# Patient Record
Sex: MALE | Race: OTHER | Employment: UNEMPLOYED | URBAN - METROPOLITAN AREA
[De-identification: names, ages, dates, MRNs, and addresses within clinical notes are randomized per-mention and may not be internally consistent; named-entity substitution may affect disease eponyms.]

---

## 2017-02-22 ENCOUNTER — APPOINTMENT (OUTPATIENT)
Dept: GENERAL RADIOLOGY | Age: 42
End: 2017-02-22
Attending: SPECIALIST
Payer: MEDICARE

## 2017-02-22 ENCOUNTER — HOSPITAL ENCOUNTER (OUTPATIENT)
Age: 42
Setting detail: OUTPATIENT SURGERY
Discharge: HOME | End: 2017-02-22
Attending: SPECIALIST | Admitting: SPECIALIST
Payer: MEDICARE

## 2017-02-22 VITALS
SYSTOLIC BLOOD PRESSURE: 134 MMHG | BODY MASS INDEX: 26.69 KG/M2 | WEIGHT: 208 LBS | OXYGEN SATURATION: 100 % | DIASTOLIC BLOOD PRESSURE: 95 MMHG | TEMPERATURE: 98.1 F | HEART RATE: 120 BPM | RESPIRATION RATE: 18 BRPM | HEIGHT: 74 IN

## 2017-02-22 DIAGNOSIS — Z46.51 FITTING AND ADJUSTMENT OF GASTRIC LAP BAND: ICD-10-CM

## 2017-02-22 PROCEDURE — 74011000250 HC RX REV CODE- 250: Performed by: SPECIALIST

## 2017-02-22 PROCEDURE — 76040000019: Performed by: SPECIALIST

## 2017-02-22 PROCEDURE — 74011000255 HC RX REV CODE- 255: Performed by: SPECIALIST

## 2017-02-22 PROCEDURE — 76000 FLUOROSCOPY <1 HR PHYS/QHP: CPT

## 2017-02-22 RX ORDER — LIDOCAINE HYDROCHLORIDE 10 MG/ML
INJECTION INFILTRATION; PERINEURAL AS NEEDED
Status: DISCONTINUED | OUTPATIENT
Start: 2017-02-22 | End: 2017-02-22 | Stop reason: HOSPADM

## 2017-02-22 NOTE — DISCHARGE INSTRUCTIONS
Shandra Ferguson  [address] [address] [facility] [address] [facility], [address] [place] [address] - [phone] Yair Borrero, Registered Dietician;  [phone]    To schedule your next adjustment, call Shelly Rodgers @ [phone]    Post Adjustable Gastric Band Fill Instructions    Your band may now be fairly tight and some swelling may occur at the band site following a fill. Therefore, you should:   Stay on liquids for 2 days   Then on soft foods for 2 days   Then resume regular foods    All meals should fit into a 4 oz. (1/2 cup) container. Eating Tips:   Use a small plate   Put your fork down between bites   Eat slowly   Do not eat and drink at the same time. Tips for Weight Loss:   Exercise and protein will  help increase and maintain and build muscle mass   Exercise at least 30-40 min. each day. Include cardiovascular and resistance training.  Drink at least 8 glasses of water every day.  Take you multi-vitamins and B vitamins every day.  Journal your food   Keep carbohydrates less than 50Gm per day. Call for a band adjustment if:   You are losing less than 1 lb per week   You are having increasing hunger between meals    Call for evaluation if you develop reflux or inability to tolerate solid foods. Your band may be too tight. Make sure you have a follow up appointment.         Support Group Meetings  [facility]  2nd Thursday every month  6:00pm

## 2017-02-22 NOTE — PROCEDURES
Lap Band Encounter (fluroscopy clinic)    Madison Shearer is gastric banding patient who had his procedure on 07/26/16.  his weight today is 94.3 kg (208 lb), which correlates to  % EBW loss. he is here today for Upper GI Study. he notes the following issues related to the banding procedure; - here for routine adjustment.       Surgery related complications; NA    Visit Vitals    BP (!) 134/95    Pulse (!) 120    Temp 98.1 °F (36.7 °C)    Resp 18    Ht 6' 2\" (1.88 m)    Wt 94.3 kg (208 lb)    SpO2 100%    BMI 26.71 kg/m2       Past Medical History:   Diagnosis Date    Balance problems     Bipolar 2 disorder (HCC)     Chronic fatigue syndrome     Chronic pain     back pain worse    Depression     ED (erectile dysfunction)     Extremity pain     Fever and chills     Fibromyalgia     GERD (gastroesophageal reflux disease)     GERD (gastroesophageal reflux disease)     Headache(784.0) since about 21y/o    excedrine migraine daily for HA multiple doses (3-4 daily) Neurologist 2008, had an MRI brain 2007    HTN (hypertension) 11/3/2010    no medication as of 4/2016    Hypertonicity of bladder     IGT (impair glucose tolerance) 12/2/2010    Insomnia     Insomnia     Neck pain     Night sweats     Obesity, Class II, BMI 35-39.9, with comorbidity (HCC)     Osteoarthritis of back     Osteoarthritis of both knees     PTSD (post-traumatic stress disorder)      related    Pure hypercholesterolemia 12/2/2010    RLS (restless legs syndrome)     Spine pain     Urge incontinence      Past Surgical History:   Procedure Laterality Date    HX OTHER SURGICAL  07/26/2016    pt states had \"lap band\" procedure    [place] LASER SURGERY OF EYE  4/2002    both     Current Facility-Administered Medications   Medication Dose Route Frequency Provider Last Rate Last Dose    barium sulfate (EZ PAQUE) 96% (w/w) contrast suspension    PRN Yaz Sanchez MD   30 mL at 02/22/17 1310    lidocaine (XYLOCAINE) 10 mg/mL (1 %) injection    PRN Jaspreet Reddy MD         Current Outpatient Prescriptions   Medication Sig Dispense Refill    gabapentin (NEURONTIN) 300 mg capsule 1 Cap daily.  tiZANidine (ZANAFLEX) 4 mg tablet as needed.  naproxen (NAPROSYN) 500 mg tablet Take 1 Tab by mouth every twelve (12) hours as needed for Pain. 30 Tab 0    multivitamin with iron (FLINTSTONES) chewable tablet Take 2 Tabs by mouth daily.  acetaminophen (TYLENOL) 325 mg tablet Take  by mouth every four (4) hours as needed for Pain.  Lisdexamfetamine (VYVANSE) 70 mg capsule Take 70 mg by mouth every morning. Indications: ATTENTION-DEFICIT HYPERACTIVITY DISORDER      ascorbic acid, vitamin C, (VITAMIN C) 500 mg tablet Take 2,000 mg by mouth daily.  LACTOBACILLUS ACIDOPHILUS (PROBIOTIC PO) Take  by mouth daily.  cyanocobalamin 1,000 mcg tablet Take 5,000 mcg by mouth daily.  b complex vitamins tablet Take 1 Tab by mouth daily.  cholecalciferol, VITAMIN D3, (VITAMIN D3) 5,000 unit tab tablet Take 5,000 Units by mouth daily.  traZODone (DESYREL) 300 mg tablet Take 150 mg by mouth nightly.  lurasidone (LATUDA) 60 mg tab tablet Take 30 mg by mouth nightly. Indications: DEPRESSION ASSOCIATED WITH BIPOLAR DISORDER      zolpidem (AMBIEN) 10 mg tablet Take 10 mg by mouth nightly as needed for Sleep.  multivitamin (ONE A DAY) tablet Take 1 Tab by mouth daily.  Omeprazole delayed release (PRILOSEC D/R) 20 mg tablet Take 20 mg by mouth every evening.  tadalafil (CIALIS) 20 mg tablet Take 1 Tab by mouth as needed. 90 day supply 18 Tab 4    dextroamphetamine-amphetamine (ADDERALL) 30 mg tablet Take 30 mg by mouth every evening.  clonazePAM (KLONOPIN) 2 mg tablet Take 1 Tab by mouth three (3) times daily.  (Patient taking differently: Take 2 mg by mouth two (2) times a day.) 3 Tab 0          Review of Symptoms:     General - No history or complaints of unexpected fever or chills  Cardiac - No history or complaints of chest pain, palpitations, or shortness of breath  Pulmonary - No history or complaints of shortness of breath or productive cough  Gastrointestinal - as noted above        Physical Exam:    General:  alert, cooperative, no distress, appears stated age   Abdomen:   abdomen is soft without significant tenderness, masses, organomegaly or guarding; port in place   Incisions: healing well, no significant drainage       Assessment:     1. History of Morbid obesity, status post gastric banding, given the fluro findings of a perfectly tight band we will proceed with the following adjustment. Plan:     Previous Fill Volume: 8 ml   Removed:       Total fill volume after today's adjustment: 8  Added: 0 ml         tight enough at 8.0 cc       Follow-up in PRN

## 2017-02-22 NOTE — IP AVS SNAPSHOT
[address] [place] [address] 
[phone] Patient: Rowan Lopez MRN: [id number] :1975 You are allergic to the following No active allergies Recent Documentation Height  
  
  
  
  
  
 1.88 m Emergency Contacts Name Discharge Info Relation Home Work Mobile Liliane Garcia DISCHARGE CAREGIVER [3] Friend [5] [phone] Kevin Miranda  Child [2]   [phone] About your hospitalization You were admitted on:  2017 You last received care in the:  [facility] ENDOSCOPY You were discharged on:  2017 Unit phone number:  [phone] Why you were hospitalized Your primary diagnosis was:  Not on File Providers Seen During Your Hospitalizations Provider Role Specialty Primary office phone Brenda Esquivel MD Attending Provider General Surgery [phone] Your Primary Care Physician (PCP) Primary Care Physician Office Phone Office Fax Radha Rondon [phone] Follow-up Information None Current Discharge Medication List  
  
ASK your doctor about these medications Dose & Instructions Dispensing Information Comments Morning Noon Evening Bedtime  
 acetaminophen 325 mg tablet Commonly known as:  TYLENOL Your next dose is: Today, Tomorrow Other:  _________ Take  by mouth every four (4) hours as needed for Pain. Refills:  0  
     
   
   
   
  
 ascorbic acid (vitamin C) 500 mg tablet Commonly known as:  VITAMIN C Your next dose is: Today, Tomorrow Other:  _________ Dose:  2000 mg Take 2,000 mg by mouth daily. Refills:  0  
     
   
   
   
  
 b complex vitamins tablet Your next dose is: Today, Tomorrow Other:  _________ Dose:  1 Tab Take 1 Tab by mouth daily. Refills:  0  
     
   
   
   
  
 cholecalciferol (VITAMIN D3) 5,000 unit Tab tablet Commonly known as:  VITAMIN D3 Your next dose is: Today, Tomorrow Other:  _________ Dose:  5000 Units Take 5,000 Units by mouth daily. Refills:  0  
     
   
   
   
  
 clonazePAM 2 mg tablet Commonly known as:  Willeen Narrow Your next dose is: Today, Tomorrow Other:  _________ Dose:  2 mg Take 1 Tab by mouth three (3) times daily. Quantity:  3 Tab Refills:  0  
     
   
   
   
  
 cyanocobalamin 1,000 mcg tablet Your next dose is: Today, Tomorrow Other:  _________ Dose:  5000 mcg Take 5,000 mcg by mouth daily. Refills:  0  
     
   
   
   
  
 dextroamphetamine-amphetamine 30 mg tablet Commonly known as:  ADDERALL Your next dose is: Today, Tomorrow Other:  _________ Dose:  30 mg Take 30 mg by mouth every evening. Refills:  0  
     
   
   
   
  
 gabapentin 300 mg capsule Commonly known as:  NEURONTIN Your next dose is: Today, Tomorrow Other:  _________ Dose:  1 Cap 1 Cap daily. Refills:  0 Lisdexamfetamine 70 mg capsule Commonly known as:  VYVANSE Your next dose is: Today, Tomorrow Other:  _________ Dose:  70 mg Take 70 mg by mouth every morning. Indications: ATTENTION-DEFICIT HYPERACTIVITY DISORDER Refills:  0  
     
   
   
   
  
 lurasidone 60 mg Tab tablet Commonly known as:  Cherie Mess Your next dose is: Today, Tomorrow Other:  _________ Dose:  30 mg Take 30 mg by mouth nightly. Indications: DEPRESSION ASSOCIATED WITH BIPOLAR DISORDER Refills:  0  
     
   
   
   
  
 multivitamin tablet Commonly known as:  ONE A DAY Your next dose is: Today, Tomorrow Other:  _________ Dose:  1 Tab Take 1 Tab by mouth daily. Refills:  0 multivitamin with iron chewable tablet Commonly known as:  Ocie Glen Flora Your next dose is: Today, Tomorrow Other:  _________ Dose:  2 Tab Take 2 Tabs by mouth daily. Refills:  0  
     
   
   
   
  
 naproxen 500 mg tablet Commonly known as:  NAPROSYN Your next dose is: Today, Tomorrow Other:  _________ Dose:  500 mg Take 1 Tab by mouth every twelve (12) hours as needed for Pain. Quantity:  30 Tab Refills:  0 Omeprazole delayed release 20 mg tablet Commonly known as:  PRILOSEC D/R Your next dose is: Today, Tomorrow Other:  _________ Dose:  20 mg Take 20 mg by mouth every evening. Refills:  0 PROBIOTIC PO Your next dose is: Today, Tomorrow Other:  _________ Take  by mouth daily. Refills:  0  
     
   
   
   
  
 tadalafil 20 mg tablet Commonly known as:  CIALIS Your next dose is: Today, Tomorrow Other:  _________ Dose:  20 mg Take 1 Tab by mouth as needed. 90 day supply Quantity:  18 Tab Refills:  4  
     
   
   
   
  
 tiZANidine 4 mg tablet Commonly known as:  Elenore Piggs Your next dose is: Today, Tomorrow Other:  _________  
   
   
 as needed. Refills:  0  
     
   
   
   
  
 traZODone 300 mg tablet Commonly known as:  Lemmie Cherri Your next dose is: Today, Tomorrow Other:  _________ Dose:  150 mg Take 150 mg by mouth nightly. Refills:  0  
     
   
   
   
  
 zolpidem 10 mg tablet Commonly known as:  AMBIEN Your next dose is: Today, Tomorrow Other:  _________ Dose:  10 mg Take 10 mg by mouth nightly as needed for Sleep. Refills:  0 Discharge Instructions [address] [facility] [address]. Rajni Olson [address] [place] [address] Drive. [facility] [place] [address] [address] [place], [address] 
[phone] Parmjit Galindo, Registered Dietician;  [phone] To schedule your next adjustment, call Odin Alonso @ [phone] Post Adjustable Gastric Band Fill Instructions Your band may now be fairly tight and some swelling may occur at the band site following a fill. Therefore, you should: 
? Stay on liquids for 2 days ? Then on soft foods for 2 days ? Then resume regular foods All meals should fit into a 4 oz. (1/2 cup) container. Eating Tips: 
? Use a small plate ? Put your fork down between bites ? Eat slowly ? Do not eat and drink at the same time. Tips for Weight Loss: 
? Exercise and protein will  help increase and maintain and build muscle mass ? Exercise at least 30-40 min. each day. Include cardiovascular and resistance training. ? Drink at least 8 glasses of water every day. ? Take you multi-vitamins and B vitamins every day. ? Journal your food ? Keep carbohydrates less than 50Gm per day. Call for a band adjustment if: 
? You are losing less than 1 lb per week ? You are having increasing hunger between meals Call for evaluation if you develop reflux or inability to tolerate solid foods. Your band may be too tight. Make sure you have a follow up appointment. Support Group Meetings [facility] 2nd Thursday every month 
6:00pm 
 
Discharge Orders None Introducing [facility]! Dear Janet Orn: 
Thank you for requesting a [employer] account. Our records indicate that you already have an active [employer] account. You can access your account anytime at [email or web address] Did you know that you can access your hospital and ER discharge instructions at any time in Ont[employer]? You can also review all of your test results from your hospital stay or ER visit. Additional Information If you have questions, please visit the Frequently Asked Questions section of the BlazeM[employer] website at [email or web address] Remember, MyChart is NOT to be used for urgent needs. For medical emergencies, dial 911. Now available from your iPhone and Android! General Information Please provide this summary of care documentation to your next provider. Patient Signature:  ____________________________________________________________ Date:  ____________________________________________________________  
  
Burlene Pradip Provider Signature:  ____________________________________________________________ Date:  ____________________________________________________________

## 2017-02-22 NOTE — IP AVS SNAPSHOT
Current Discharge Medication List  
  
ASK your doctor about these medications Dose & Instructions Dispensing Information Comments Morning Noon Evening Bedtime  
 acetaminophen 325 mg tablet Commonly known as:  TYLENOL Your next dose is: Today, Tomorrow Other:  ____________ Take  by mouth every four (4) hours as needed for Pain. Refills:  0  
     
   
   
   
  
 ascorbic acid (vitamin C) 500 mg tablet Commonly known as:  VITAMIN C Your next dose is: Today, Tomorrow Other:  ____________ Dose:  2000 mg Take 2,000 mg by mouth daily. Refills:  0  
     
   
   
   
  
 b complex vitamins tablet Your next dose is: Today, Tomorrow Other:  ____________ Dose:  1 Tab Take 1 Tab by mouth daily. Refills:  0  
     
   
   
   
  
 cholecalciferol (VITAMIN D3) 5,000 unit Tab tablet Commonly known as:  VITAMIN D3 Your next dose is: Today, Tomorrow Other:  ____________ Dose:  5000 Units Take 5,000 Units by mouth daily. Refills:  0  
     
   
   
   
  
 clonazePAM 2 mg tablet Commonly known as:  Tc Brocks Your next dose is: Today, Tomorrow Other:  ____________ Dose:  2 mg Take 1 Tab by mouth three (3) times daily. Quantity:  3 Tab Refills:  0  
     
   
   
   
  
 cyanocobalamin 1,000 mcg tablet Your next dose is: Today, Tomorrow Other:  ____________ Dose:  5000 mcg Take 5,000 mcg by mouth daily. Refills:  0  
     
   
   
   
  
 dextroamphetamine-amphetamine 30 mg tablet Commonly known as:  ADDERALL Your next dose is: Today, Tomorrow Other:  ____________ Dose:  30 mg Take 30 mg by mouth every evening. Refills:  0  
     
   
   
   
  
 gabapentin 300 mg capsule Commonly known as:  NEURONTIN Your next dose is: Today, Tomorrow Other:  ____________ Dose:  1 Cap 1 Cap daily. Refills:  0 Lisdexamfetamine 70 mg capsule Commonly known as:  VYVANSE Your next dose is: Today, Tomorrow Other:  ____________ Dose:  70 mg Take 70 mg by mouth every morning. Indications: ATTENTION-DEFICIT HYPERACTIVITY DISORDER Refills:  0  
     
   
   
   
  
 lurasidone 60 mg Tab tablet Commonly known as:  Khadra Jay Your next dose is: Today, Tomorrow Other:  ____________ Dose:  30 mg Take 30 mg by mouth nightly. Indications: DEPRESSION ASSOCIATED WITH BIPOLAR DISORDER Refills:  0  
     
   
   
   
  
 multivitamin tablet Commonly known as:  ONE A DAY Your next dose is: Today, Tomorrow Other:  ____________ Dose:  1 Tab Take 1 Tab by mouth daily. Refills:  0  
     
   
   
   
  
 multivitamin with iron chewable tablet Commonly known as:  Lexy Gift Your next dose is: Today, Tomorrow Other:  ____________ Dose:  2 Tab Take 2 Tabs by mouth daily. Refills:  0  
     
   
   
   
  
 naproxen 500 mg tablet Commonly known as:  NAPROSYN Your next dose is: Today, Tomorrow Other:  ____________ Dose:  500 mg Take 1 Tab by mouth every twelve (12) hours as needed for Pain. Quantity:  30 Tab Refills:  0 Omeprazole delayed release 20 mg tablet Commonly known as:  PRILOSEC D/R Your next dose is: Today, Tomorrow Other:  ____________ Dose:  20 mg Take 20 mg by mouth every evening. Refills:  0 PROBIOTIC PO Your next dose is: Today, Tomorrow Other:  ____________ Take  by mouth daily. Refills:  0  
     
   
   
   
  
 tadalafil 20 mg tablet Commonly known as:  CIALIS Your next dose is: Today, Tomorrow Other:  ____________ Dose:  20 mg Take 1 Tab by mouth as needed. 90 day supply Quantity:  18 Tab Refills:  4  
     
   
   
   
  
 tiZANidine 4 mg tablet Commonly known as:  Clearance Ignacia Your next dose is: Today, Tomorrow Other:  ____________  
   
   
 as needed. Refills:  0  
     
   
   
   
  
 traZODone 300 mg tablet Commonly known as:  Orma Paddy Your next dose is: Today, Tomorrow Other:  ____________ Dose:  150 mg Take 150 mg by mouth nightly. Refills:  0  
     
   
   
   
  
 zolpidem 10 mg tablet Commonly known as:  AMBIEN Your next dose is: Today, Tomorrow Other:  ____________ Dose:  10 mg Take 10 mg by mouth nightly as needed for Sleep. Refills:  0

## 2017-03-01 NOTE — OP NOTES
[address] [place] [address]  OPERATIVE REPORT    Name:  Melida Contreras  MR#:  [id number]  :  1975  Account #:  [de-identified]  Date of Adm:  2017  Date of Surgery:  2017      PREOPERATIVE DIAGNOSIS: Status post gastric band placement for  possible adjustment. POSTOPERATIVE DIAGNOSIS: Status post gastric band placement  for possible adjustment with band that is appropriately tightened. PROCEDURES PERFORMED: Upper gastrointestinal study with  barium. ESTIMATED BLOOD LOSS: None. SPECIMENS REMOVED: None. ANESTHESIA:  none    STATEMENT OF MEDICAL NECESSITY: The patient is a 42-year-old  gentleman who has had a gastric band now for about 6 months. He  has done exceptionally well, achieving very near ideal body weight to  this point. He is here today for upper GI study to assess if he can have  another adjustment. DESCRIPTION OF PROCEDURE: The patient was brought to the  fluoroscopy unit, where he was given thin barium. On swallowing of  barium, he was noted to have normal peristalsis of his esophagus. He  had proximal and distal esophagus with tapering into and through the  gastroesophageal junction. Contrast then filled the gastric pouch,  which was tiny in nature. The gastric band was oriented appropriately  at 45 degree angle. Contrast hesitated just slightly at the level of the  band, but then flowed into the gastric cardia and fundus in normal  fashion. At this juncture, I think the patient has done exceptionally well  with the lap band. I really do not see any need in adjusting him today  and he certainly agrees to that. We will see him back in about 3  months for his routine followup.         Desirae Mendosa MD    AT / CD  D:  2017   15:00  T:  2017   04:35  Job #:  [id number]

## 2017-06-07 ENCOUNTER — HOSPITAL ENCOUNTER (OUTPATIENT)
Age: 42
Setting detail: OUTPATIENT SURGERY
Discharge: HOME | End: 2017-06-07
Attending: SPECIALIST | Admitting: SPECIALIST
Payer: MEDICARE

## 2017-06-07 ENCOUNTER — APPOINTMENT (OUTPATIENT)
Dept: GENERAL RADIOLOGY | Age: 42
End: 2017-06-07
Attending: SPECIALIST
Payer: MEDICARE

## 2017-06-07 VITALS
SYSTOLIC BLOOD PRESSURE: 167 MMHG | DIASTOLIC BLOOD PRESSURE: 116 MMHG | HEART RATE: 84 BPM | OXYGEN SATURATION: 100 % | WEIGHT: 211.8 LBS | BODY MASS INDEX: 27.19 KG/M2 | TEMPERATURE: 97.8 F | RESPIRATION RATE: 18 BRPM

## 2017-06-07 DIAGNOSIS — E66.01 MORBID OBESITY (HCC): ICD-10-CM

## 2017-06-07 PROCEDURE — 76000 FLUOROSCOPY <1 HR PHYS/QHP: CPT

## 2017-06-07 PROCEDURE — 74011000250 HC RX REV CODE- 250: Performed by: SPECIALIST

## 2017-06-07 PROCEDURE — 74011000255 HC RX REV CODE- 255: Performed by: SPECIALIST

## 2017-06-07 PROCEDURE — 43999 UNLISTED PROCEDURE STOMACH: CPT | Performed by: SPECIALIST

## 2017-06-07 PROCEDURE — 76040000019: Performed by: SPECIALIST

## 2017-06-07 RX ORDER — LIDOCAINE HYDROCHLORIDE 10 MG/ML
INJECTION INFILTRATION; PERINEURAL AS NEEDED
Status: DISCONTINUED | OUTPATIENT
Start: 2017-06-07 | End: 2017-06-07 | Stop reason: HOSPADM

## 2017-06-07 NOTE — PROCEDURES
Lap Band Encounter (fluroscopy clinic)    Madison Guerrero is gastric banding patient who had his procedure on 07/26/16.  his weight today is 96.1 kg (211 lb 12.8 oz), which correlates to  % EBW loss. he is here today for Lap Band Adjustment / Fill with Fluoroscopy Guidance. he notes the following issues related to the banding procedure; - here for routine adjustment.       Surgery related complications; NA    Visit Vitals    BP (!) 167/116    Pulse 84    Temp 97.8 °F (36.6 °C)    Resp 18    Wt 96.1 kg (211 lb 12.8 oz)    SpO2 100%    BMI 27.19 kg/m2       Past Medical History:   Diagnosis Date    Balance problems     Bipolar 2 disorder (HCC)     Chronic fatigue syndrome     Chronic pain     back pain worse    Depression     ED (erectile dysfunction)     Extremity pain     Fever and chills     Fibromyalgia     GERD (gastroesophageal reflux disease)     GERD (gastroesophageal reflux disease)     Headache(784.0) since about 21y/o    excedrine migraine daily for HA multiple doses (3-4 daily) Neurologist 2008, had an MRI brain 2007    HTN (hypertension) 11/3/2010    no medication as of 4/2016    Hypertonicity of bladder     IGT (impair glucose tolerance) 12/2/2010    Insomnia     Insomnia     Neck pain     Night sweats     Obesity, Class II, BMI 35-39.9, with comorbidity (HCC)     Osteoarthritis of back     Osteoarthritis of both knees     PTSD (post-traumatic stress disorder)      related    Pure hypercholesterolemia 12/2/2010    RLS (restless legs syndrome)     Spine pain     Urge incontinence      Past Surgical History:   Procedure Laterality Date    HX OTHER SURGICAL  07/26/2016    pt states had \"lap band\" procedure    [place] LASER SURGERY OF EYE  4/2002    both     Current Facility-Administered Medications   Medication Dose Route Frequency Provider Last Rate Last Dose    barium sulfate (EZ PAQUE) 96% (w/w) contrast suspension    PRN William Giron MD   30 mL at 06/07/17 1319    lidocaine (XYLOCAINE) 10 mg/mL (1 %) injection    PRN Joselin Alvarado MD   1.5 mL at 06/07/17 1320          Review of Symptoms:     General - No history or complaints of unexpected fever or chills  Cardiac - No history or complaints of chest pain, palpitations, or shortness of breath  Pulmonary - No history or complaints of shortness of breath or productive cough  Gastrointestinal - as noted above        Physical Exam:    General:  alert, cooperative, no distress, appears stated age   Abdomen:   abdomen is soft without significant tenderness, masses, organomegaly or guarding; port in place   Incisions: healing well, no significant drainage       Assessment:     1. History of Morbid obesity, status post gastric banding, given the fluro findings we will proceed with the following adjustment. Plan:     Previous Fill Volume: 8 ml   Removed:       Total fill volume after today's adjustment: 9  Added: 1 ml                Follow-up in PRN

## 2017-06-07 NOTE — IP AVS SNAPSHOT
[address] [place] [id number] 
[phone] Patient: Chelsea Stover MRN: [id number] :1975 You are allergic to the following No active allergies Recent Documentation Weight BMI Smoking Status 96.1 kg 27.19 kg/m2 Never Smoker Emergency Contacts Name Discharge Info Relation Home Work Mobile Liliane Garcia DISCHARGE CAREGIVER [3] Friend [5] [phone] Kevin Miranda  Child [2]   [phone] Lv Miranda  Parent [1] [phone] About your hospitalization You were admitted on:  2017 You last received care in the:  [facility] You were discharged on:  2017 Unit phone number:  [phone] Why you were hospitalized Your primary diagnosis was:  Not on File Providers Seen During Your Hospitalizations Provider Role Specialty Primary office phone Leeann Morgan MD Attending Provider General Surgery [phone] Your Primary Care Physician (PCP) Primary Care Physician Office Phone Office Fax Maria De Jesus Rivas [phone] Follow-up Information None Current Discharge Medication List  
  
ASK your doctor about these medications Dose & Instructions Dispensing Information Comments Morning Noon Evening Bedtime  
 acetaminophen 325 mg tablet Commonly known as:  TYLENOL Your last dose was: Your next dose is: Take  by mouth every four (4) hours as needed for Pain. Refills:  0  
     
   
   
   
  
 ascorbic acid (vitamin C) 500 mg tablet Commonly known as:  VITAMIN C Your last dose was: Your next dose is:    
   
   
 Dose:  2000 mg Take 2,000 mg by mouth daily. Refills:  0  
     
   
   
   
  
 b complex vitamins tablet Your last dose was: Your next dose is:    
   
   
 Dose:  1 Tab Take 1 Tab by mouth daily. Refills:  0  
     
   
   
   
  
 cholecalciferol (VITAMIN D3) 5,000 unit Tab tablet Commonly known as:  VITAMIN D3 Your last dose was: Your next dose is:    
   
   
 Dose:  5000 Units Take 5,000 Units by mouth daily. Refills:  0  
     
   
   
   
  
 clonazePAM 2 mg tablet Commonly known as:  Valere Idler Your last dose was: Your next dose is:    
   
   
 Dose:  2 mg Take 1 Tab by mouth three (3) times daily. Quantity:  3 Tab Refills:  0  
     
   
   
   
  
 cyanocobalamin 1,000 mcg tablet Your last dose was: Your next dose is:    
   
   
 Dose:  5000 mcg Take 5,000 mcg by mouth daily. Refills:  0  
     
   
   
   
  
 dextroamphetamine-amphetamine 30 mg tablet Commonly known as:  ADDERALL Your last dose was: Your next dose is:    
   
   
 Dose:  30 mg Take 30 mg by mouth every evening. Refills:  0  
     
   
   
   
  
 gabapentin 300 mg capsule Commonly known as:  NEURONTIN Your last dose was: Your next dose is:    
   
   
 Dose:  1 Cap 1 Cap daily. Refills:  0 Lisdexamfetamine 70 mg capsule Commonly known as:  VYVANSE Your last dose was: Your next dose is:    
   
   
 Dose:  70 mg Take 70 mg by mouth every morning. Indications: ATTENTION-DEFICIT HYPERACTIVITY DISORDER Refills:  0  
     
   
   
   
  
 lurasidone 60 mg Tab tablet Commonly known as:  Nayeli [place] Your last dose was: Your next dose is:    
   
   
 Dose:  30 mg Take 30 mg by mouth nightly. Indications: DEPRESSION ASSOCIATED WITH BIPOLAR DISORDER Refills:  0  
     
   
   
   
  
 multivitamin tablet Commonly known as:  ONE A DAY Your last dose was: Your next dose is:    
   
   
 Dose:  1 Tab Take 1 Tab by mouth daily. Refills:  0  
     
   
   
   
  
 multivitamin with iron chewable tablet Commonly known as:  Kristinejose Brown Your last dose was: Your next dose is:    
   
   
 Dose:  2 Tab Take 2 Tabs by mouth daily. Refills:  0  
     
   
   
   
  
 naproxen 500 mg tablet Commonly known as:  NAPROSYN Your last dose was: Your next dose is:    
   
   
 Dose:  500 mg Take 1 Tab by mouth every twelve (12) hours as needed for Pain. Quantity:  30 Tab Refills:  0 Omeprazole delayed release 20 mg tablet Commonly known as:  PRILOSEC D/R Your last dose was: Your next dose is:    
   
   
 Dose:  20 mg Take 20 mg by mouth every evening. Refills:  0 PROBIOTIC PO Your last dose was: Your next dose is: Take  by mouth daily. Refills:  0  
     
   
   
   
  
 tadalafil 20 mg tablet Commonly known as:  CIALIS Your last dose was: Your next dose is:    
   
   
 Dose:  20 mg Take 1 Tab by mouth as needed. 90 day supply Quantity:  18 Tab Refills:  4  
     
   
   
   
  
 tiZANidine 4 mg tablet Commonly known as:  Destiny Liming Your last dose was: Your next dose is:    
   
   
 as needed. Refills:  0  
     
   
   
   
  
 traZODone 300 mg tablet Commonly known as:  Alexia Salt Your last dose was: Your next dose is:    
   
   
 Dose:  150 mg Take 150 mg by mouth nightly. Refills:  0  
     
   
   
   
  
 zolpidem 10 mg tablet Commonly known as:  AMBIEN Your last dose was: Your next dose is:    
   
   
 Dose:  10 mg Take 10 mg by mouth nightly as needed for Sleep. Refills:  0 Discharge Instructions [address] [facility] [address] Max [address] [facility] [address] [place] [address], [facility] [address] 
[phone] Nithin Austin, Registered Dietician;  [phone] To schedule your next adjustment, call Manpreet German @ [phone] Post Adjustable Gastric Band Fill Instructions Your band may now be fairly tight and some swelling may occur at the band site following a fill. Therefore, you should: 
? Stay on liquids for 2 days ? Then on soft foods for 2 days ? Then resume regular foods All meals should fit into a 4 oz. (1/2 cup) container. Eating Tips: 
? Use a small plate ? Put your fork down between bites ? Eat slowly ? Do not eat and drink at the same time. Tips for Weight Loss: 
? Exercise and protein will  help increase and maintain and build muscle mass ? Exercise at least 30-40 min. each day. Include cardiovascular and resistance training. ? Drink at least 8 glasses of water every day. ? Take you multi-vitamins and B vitamins every day. ? Journal your food ? Keep carbohydrates less than 50Gm per day. Call for a band adjustment if: 
? You are losing less than 1 lb per week ? You are having increasing hunger between meals Call for evaluation if you develop reflux or inability to tolerate solid foods. Your band may be too tight. Make sure you have a follow up appointment. Support Group Meetings [facility] 2nd Thursday every month 
6:00pm 
 
Discharge Orders None ACO Transitions of Care Introducing [employer] [address] offers a voluntary care coordination program to provide high quality service and care to [facility] fee-for-service beneficiaries. Merlinda Fells was designed to help you enhance your health and well-being through the following services: ? Transitions of Care  support for individuals who are transitioning from one care setting to another (example: Hospital to home). ?  Chronic and Complex Care Coordination  support for individuals and caregivers of those with serious or chronic illnesses or with more than one chronic (ongoing) condition and those who take a number of different medications. If you meet specific medical criteria, a [address] [place] [address] Rd may call you directly to coordinate your care with your primary care physician and your other care providers. For questions about the [facility] programs, please, contact your physicians office. For general questions or additional information about Accountable Care Organizations: 
Please visit www.medicare.gov/acos. html or call 1-800-MEDICARE (5-372.214.9581) TTY users should call 6-702.642.9422. Introducing [facility] SERVICES! Dear Stacy Rodriguez: 
Thank you for requesting a [employer] account. Our records indicate that you already have an active [employer] account. You can access your account anytime at [email or web address] Did you know that you can access your hospital and ER discharge instructions at any time in [employer]? You can also review all of your test results from your hospital stay or ER visit. Additional Information If you have questions, please visit the Frequently Asked Questions section of the [employer] website at [email or web address] Remember, [employer] is NOT to be used for urgent needs. For medical emergencies, dial 911. Now available from your iPhone and Android! General Information Please provide this summary of care documentation to your next provider. Patient Signature:  ____________________________________________________________ Date:  ____________________________________________________________  
  
Muara Deras Provider Signature:  ____________________________________________________________ Date:  ____________________________________________________________

## 2017-06-07 NOTE — DISCHARGE INSTRUCTIONS
Lou Mishra  [address] [facility] [address] [facility], [address] [place] [address] - [phone] Yair Borrero, Registered Dietician;  [phone]    To schedule your next adjustment, call Lisa Craft @ [phone]    Post Adjustable Gastric Band Fill Instructions    Your band may now be fairly tight and some swelling may occur at the band site following a fill. Therefore, you should:   Stay on liquids for 2 days   Then on soft foods for 2 days   Then resume regular foods    All meals should fit into a 4 oz. (1/2 cup) container. Eating Tips:   Use a small plate   Put your fork down between bites   Eat slowly   Do not eat and drink at the same time. Tips for Weight Loss:   Exercise and protein will  help increase and maintain and build muscle mass   Exercise at least 30-40 min. each day. Include cardiovascular and resistance training.  Drink at least 8 glasses of water every day.  Take you multi-vitamins and B vitamins every day.  Journal your food   Keep carbohydrates less than 50Gm per day. Call for a band adjustment if:   You are losing less than 1 lb per week   You are having increasing hunger between meals    Call for evaluation if you develop reflux or inability to tolerate solid foods. Your band may be too tight. Make sure you have a follow up appointment.         Support Group Meetings  [facility]  2nd Thursday every month  6:00pm

## 2017-06-07 NOTE — IP AVS SNAPSHOT
Current Discharge Medication List  
  
ASK your doctor about these medications Dose & Instructions Dispensing Information Comments Morning Noon Evening Bedtime  
 acetaminophen 325 mg tablet Commonly known as:  TYLENOL Your last dose was: Your next dose is: Take  by mouth every four (4) hours as needed for Pain. Refills:  0  
     
   
   
   
  
 ascorbic acid (vitamin C) 500 mg tablet Commonly known as:  VITAMIN C Your last dose was: Your next dose is:    
   
   
 Dose:  2000 mg Take 2,000 mg by mouth daily. Refills:  0  
     
   
   
   
  
 b complex vitamins tablet Your last dose was: Your next dose is:    
   
   
 Dose:  1 Tab Take 1 Tab by mouth daily. Refills:  0  
     
   
   
   
  
 cholecalciferol (VITAMIN D3) 5,000 unit Tab tablet Commonly known as:  VITAMIN D3 Your last dose was: Your next dose is:    
   
   
 Dose:  5000 Units Take 5,000 Units by mouth daily. Refills:  0  
     
   
   
   
  
 clonazePAM 2 mg tablet Commonly known as:  Milly Loge Your last dose was: Your next dose is:    
   
   
 Dose:  2 mg Take 1 Tab by mouth three (3) times daily. Quantity:  3 Tab Refills:  0  
     
   
   
   
  
 cyanocobalamin 1,000 mcg tablet Your last dose was: Your next dose is:    
   
   
 Dose:  5000 mcg Take 5,000 mcg by mouth daily. Refills:  0  
     
   
   
   
  
 dextroamphetamine-amphetamine 30 mg tablet Commonly known as:  ADDERALL Your last dose was: Your next dose is:    
   
   
 Dose:  30 mg Take 30 mg by mouth every evening. Refills:  0  
     
   
   
   
  
 gabapentin 300 mg capsule Commonly known as:  NEURONTIN Your last dose was: Your next dose is:    
   
   
 Dose:  1 Cap 1 Cap daily. Refills:  0 Lisdexamfetamine 70 mg capsule Commonly known as:  VYVANSE  
   
 Your last dose was: Your next dose is:    
   
   
 Dose:  70 mg Take 70 mg by mouth every morning. Indications: ATTENTION-DEFICIT HYPERACTIVITY DISORDER Refills:  0  
     
   
   
   
  
 lurasidone 60 mg Tab tablet Commonly known as:  Camryn Citron Your last dose was: Your next dose is:    
   
   
 Dose:  30 mg Take 30 mg by mouth nightly. Indications: DEPRESSION ASSOCIATED WITH BIPOLAR DISORDER Refills:  0  
     
   
   
   
  
 multivitamin tablet Commonly known as:  ONE A DAY Your last dose was: Your next dose is:    
   
   
 Dose:  1 Tab Take 1 Tab by mouth daily. Refills:  0  
     
   
   
   
  
 multivitamin with iron chewable tablet Commonly known as:  Ean Sosa Your last dose was: Your next dose is:    
   
   
 Dose:  2 Tab Take 2 Tabs by mouth daily. Refills:  0  
     
   
   
   
  
 naproxen 500 mg tablet Commonly known as:  NAPROSYN Your last dose was: Your next dose is:    
   
   
 Dose:  500 mg Take 1 Tab by mouth every twelve (12) hours as needed for Pain. Quantity:  30 Tab Refills:  0 Omeprazole delayed release 20 mg tablet Commonly known as:  PRILOSEC D/R Your last dose was: Your next dose is:    
   
   
 Dose:  20 mg Take 20 mg by mouth every evening. Refills:  0 PROBIOTIC PO Your last dose was: Your next dose is: Take  by mouth daily. Refills:  0  
     
   
   
   
  
 tadalafil 20 mg tablet Commonly known as:  CIALIS Your last dose was: Your next dose is:    
   
   
 Dose:  20 mg Take 1 Tab by mouth as needed. 90 day supply Quantity:  18 Tab Refills:  4  
     
   
   
   
  
 tiZANidine 4 mg tablet Commonly known as:  Marlon Schwkaylin Your last dose was: Your next dose is:    
   
   
 as needed. Refills:  0  
     
   
   
   
  
 traZODone 300 mg tablet Commonly known as:  José Migeul De Los Santos Your last dose was: Your next dose is:    
   
   
 Dose:  150 mg Take 150 mg by mouth nightly. Refills:  0  
     
   
   
   
  
 zolpidem 10 mg tablet Commonly known as:  AMBIEN Your last dose was: Your next dose is:    
   
   
 Dose:  10 mg Take 10 mg by mouth nightly as needed for Sleep. Refills:  0

## 2017-06-27 ENCOUNTER — HOSPITAL ENCOUNTER (OUTPATIENT)
Dept: LAB | Age: 42
Discharge: HOME | End: 2017-06-27
Payer: MEDICARE

## 2017-06-27 ENCOUNTER — OFFICE VISIT (OUTPATIENT)
Dept: INTERNAL MEDICINE CLINIC | Age: 42
End: 2017-06-27

## 2017-06-27 VITALS
TEMPERATURE: 97.7 F | BODY MASS INDEX: 27.31 KG/M2 | OXYGEN SATURATION: 100 % | RESPIRATION RATE: 16 BRPM | HEIGHT: 74 IN | DIASTOLIC BLOOD PRESSURE: 80 MMHG | HEART RATE: 73 BPM | SYSTOLIC BLOOD PRESSURE: 120 MMHG | WEIGHT: 212.8 LBS

## 2017-06-27 DIAGNOSIS — Z00.00 ROUTINE GENERAL MEDICAL EXAMINATION AT A HEALTH CARE FACILITY: Primary | ICD-10-CM

## 2017-06-27 DIAGNOSIS — D35.2 PITUITARY ADENOMA (HCC): ICD-10-CM

## 2017-06-27 DIAGNOSIS — Z23 ENCOUNTER FOR IMMUNIZATION: ICD-10-CM

## 2017-06-27 DIAGNOSIS — G89.29 CHRONIC PAIN OF LEFT ANKLE: ICD-10-CM

## 2017-06-27 DIAGNOSIS — L30.9 DERMATITIS: ICD-10-CM

## 2017-06-27 DIAGNOSIS — Z11.3 ROUTINE SCREENING FOR STI (SEXUALLY TRANSMITTED INFECTION): ICD-10-CM

## 2017-06-27 DIAGNOSIS — N52.9 ERECTILE DYSFUNCTION, UNSPECIFIED ERECTILE DYSFUNCTION TYPE: ICD-10-CM

## 2017-06-27 DIAGNOSIS — M25.572 CHRONIC PAIN OF LEFT ANKLE: ICD-10-CM

## 2017-06-27 PROCEDURE — 84439 ASSAY OF FREE THYROXINE: CPT

## 2017-06-27 PROCEDURE — 86704 HEP B CORE ANTIBODY TOTAL: CPT

## 2017-06-27 PROCEDURE — 86780 TREPONEMA PALLIDUM: CPT

## 2017-06-27 PROCEDURE — 83002 ASSAY OF GONADOTROPIN (LH): CPT

## 2017-06-27 PROCEDURE — 80061 LIPID PANEL: CPT

## 2017-06-27 PROCEDURE — 83036 HEMOGLOBIN GLYCOSYLATED A1C: CPT

## 2017-06-27 PROCEDURE — 87340 HEPATITIS B SURFACE AG IA: CPT

## 2017-06-27 PROCEDURE — 85025 COMPLETE CBC W/AUTO DIFF WBC: CPT

## 2017-06-27 PROCEDURE — 86706 HEP B SURFACE ANTIBODY: CPT

## 2017-06-27 PROCEDURE — 86803 HEPATITIS C AB TEST: CPT

## 2017-06-27 PROCEDURE — 80053 COMPREHEN METABOLIC PANEL: CPT

## 2017-06-27 PROCEDURE — 84403 ASSAY OF TOTAL TESTOSTERONE: CPT

## 2017-06-27 PROCEDURE — 84146 ASSAY OF PROLACTIN: CPT

## 2017-06-27 PROCEDURE — 84443 ASSAY THYROID STIM HORMONE: CPT

## 2017-06-27 PROCEDURE — 87591 N.GONORRHOEAE DNA AMP PROB: CPT

## 2017-06-27 PROCEDURE — 87389 HIV-1 AG W/HIV-1&-2 AB AG IA: CPT

## 2017-06-27 RX ORDER — TADALAFIL 20 MG/1
20 TABLET ORAL AS NEEDED
Qty: 18 TAB | Refills: 3 | Status: SHIPPED | OUTPATIENT
Start: 2017-06-27 | End: 2018-03-12 | Stop reason: SDUPTHER

## 2017-06-27 NOTE — PROGRESS NOTES
HISTORY OF PRESENT ILLNESS  Gurwinder Jc is a 38 y.o. male. HPI      Here for evaluation:    Chief Complaint   Patient presents with   Jc Liao Annual Wellness Visit    Labs    Medication Refill    Foot Pain     left heel pain x 7-8 months       Reviewed with pt, unable to complete wellness and all problems at single visit today. He prefers to address as below. Last/only visit here with me Aug 2016. He notes only  Needs Cialis refill. Prior sent to mail order with another provider--reviewed and eRx today as prior. He notes no FH of colon or prostate problems. Has ED, and not seen by urology prior. Reviewed referral PRN today. No problems with Cialis. #18 is quantity limit for this for 3mo supply. Reviewed routine physical/fasting labs with pt at visit. He has had low testosterone prior. Not on supplements prior--notes when he was 33-34yr old when last tested. ROS      Blood pressure 120/80, pulse 73, temperature 97.7 °F (36.5 °C), temperature source Oral, resp. rate 16, height 6' 2\" (1.88 m), weight 212 lb 12.8 oz (96.5 kg), SpO2 100 %. Physical Exam   Constitutional: He appears well-developed and well-nourished. No distress. HENT:   Head: Normocephalic and atraumatic. Right Ear: External ear normal.   Left Ear: External ear normal.   Nose: Nose normal.   Mouth/Throat: Oropharynx is clear and moist. No oropharyngeal exudate. Tragal erythema bilat. Mild--right > left. Eyes: Conjunctivae are normal. Right eye exhibits no discharge. Left eye exhibits no discharge. No scleral icterus. Undilated fundoscopic exam normal bilaterally. Neck: Normal range of motion. Neck supple. No tracheal deviation present. No thyromegaly present. Cardiovascular: Normal rate, regular rhythm, normal heart sounds and intact distal pulses. Exam reveals no gallop and no friction rub. No murmur heard. Pulmonary/Chest: Effort normal and breath sounds normal. No stridor. No respiratory distress. He has no wheezes. He has no rales. Abdominal: Soft. Bowel sounds are normal. He exhibits no distension. There is no tenderness. There is no rebound and no guarding. Pump/reservoir RUQ--prior gastric banding. Genitourinary:   Genitourinary Comments: Plan at follow-up. Musculoskeletal: He exhibits no edema or tenderness. Left achilles insertion pain. No erythema noted. No ortho follow-up currently. Remote tear left achilles, but resolved with follow-up and therapy. Lymphadenopathy:     He has no cervical adenopathy. Neurological: He is alert. He exhibits normal muscle tone. Coordination normal.   Skin: Skin is warm. No rash noted. He is not diaphoretic. No erythema. No pallor. Diffuse truncal rash. [place]-colored. Psychiatric: He has a normal mood and affect. His behavior is normal. Judgment and thought content normal.       ASSESSMENT and PLAN    ICD-10-CM ICD-9-CM    1. Routine general medical examination at a health care facility Z00.00 V70.0 CBC WITH AUTOMATED DIFF      METABOLIC PANEL, COMPREHENSIVE      LIPID PANEL      HEMOGLOBIN A1C WITH EAG   2. Encounter for immunization Z23 V03.89 TETANUS, DIPHTHERIA TOXOIDS AND ACELLULAR PERTUSSIS VACCINE (TDAP), IN INDIVIDS. >=7, IM   3. Routine screening for STI (sexually transmitted infection) Z11.3 V74.5 T PALLIDUM SCREEN W/REFLEX      HIV 1/2 AG/AB, 4TH GENERATION,W RFLX CONFIRM      CHLAMYDIA/NEISSERIA BY [facility] W/REFLEX CONFIRM      HEP B SURFACE AB      HEPATITIS B CORE AB, TOTAL      HEP B SURFACE AG      HCV AB W/RFLX TO [facility]      CHLAMYDIA/NEISSERIA BY [facility]RISTIANA W/REFLEX CONFIRM   4. Chronic pain of left ankle M25.572 719.47 REFERRAL TO SPORTS MEDICINE    G89.29 338.29    5. Erectile dysfunction, unspecified erectile dysfunction type N52.9 607.84 tadalafil (CIALIS) 20 mg tablet   6.  Pituitary adenoma ([facility] D35.2 227.3 TESTOSTERONE, FREE & TOTAL      PROLACTIN      FSH AND LH      T4, FREE      TSH 3RD GENERATION      REFERRAL TO ENDOCRINOLOGY MRI BRAIN W WO CONT   7. Dermatitis L30.9 692.9 REFERRAL TO DERMATOLOGY       Diagnoses #1-3 for Medicare Wellness/Preventive visit. Due to significant separate problems unrelated to [facility] Wellness Visit, also addressed following diagnoses/problems at visit as below (diagnoses #4-7 above). Follow-up Disposition:  Return in about 1 week (around 7/4/2017) for lab review, referral follow-up.  lab results and schedule of future lab studies reviewed with patient  reviewed diet, exercise and weight control  reviewed medications and side effects in detail  Imaging reviewed with pt at visit. Referral(s) and referral coordination reviewed with patient at visit. For additional documentation of information and/or recommendations discussed this visit, please see notes in instructions. Plan and evaluation (above) reviewed with pt at visit  Patient voiced understanding of plan and provided with time to ask/review questions. After Visit Summary (AVS) provided to pt after visit with additional instructions as needed/reviewed. This is a Subsequent Medicare Annual Wellness Visit providing Personalized Prevention Plan Services (PPPS) (Performed 12 months after initial AWV and PPPS )    I have reviewed the patient's medical history in detail and updated the computerized patient record. History     See above for problems and preventive care associated with this visit. Here for Medicare Wellness Visit. The problems listed above were also addressed today during the same visit due to need for evaluation and follow-up with patient today.     Past Medical History:   Diagnosis Date    Balance problems     Bipolar 2 disorder (HCC)     Chronic fatigue syndrome     Chronic pain     back pain worse    Depression     ED (erectile dysfunction)     Extremity pain     Fever and chills     Fibromyalgia     GERD (gastroesophageal reflux disease)     GERD (gastroesophageal reflux disease)     Headache since about 23y/o    excedrine migraine daily for HA multiple doses (3-4 daily) Neurologist 2008, had an MRI brain 2007    HTN (hypertension) 11/3/2010    no medication as of 4/2016    Hypertonicity of bladder     IGT (impair glucose tolerance) 12/2/2010    Insomnia     Insomnia     Neck pain     Night sweats     Obesity, Class II, BMI 35-39.9, with comorbidity ([address]     Osteoarthritis of back     Osteoarthritis of both knees     PTSD (post-traumatic stress disorder)      related    Pure hypercholesterolemia 12/2/2010    RLS (restless legs syndrome)     Spine pain     Urge incontinence       Past Surgical History:   Procedure Laterality Date    HX OTHER SURGICAL  07/26/2016    pt states had \"lap band\" procedure    [place] LASER SURGERY OF EYE  4/2002    both     Current Outpatient Prescriptions   Medication Sig Dispense Refill    tadalafil (CIALIS) 20 mg tablet Take 1 Tab by mouth as needed. 90 day supply 18 Tab 3    gabapentin (NEURONTIN) 300 mg capsule 1 Cap daily.  Lisdexamfetamine (VYVANSE) 70 mg capsule Take 70 mg by mouth every morning. Indications: ATTENTION-DEFICIT HYPERACTIVITY DISORDER      ascorbic acid, vitamin C, (VITAMIN C) 500 mg tablet Take 2,000 mg by mouth daily.  LACTOBACILLUS ACIDOPHILUS (PROBIOTIC PO) Take  by mouth daily.  cyanocobalamin 1,000 mcg tablet Take 5,000 mcg by mouth daily.  b complex vitamins tablet Take 1 Tab by mouth daily.  cholecalciferol, VITAMIN D3, (VITAMIN D3) 5,000 unit tab tablet Take 5,000 Units by mouth daily.  traZODone (DESYREL) 300 mg tablet Take 150 mg by mouth nightly.  lurasidone (LATUDA) 60 mg tab tablet Take 30 mg by mouth nightly. Indications: DEPRESSION ASSOCIATED WITH BIPOLAR DISORDER      multivitamin (ONE A DAY) tablet Take 1 Tab by mouth daily.  dextroamphetamine-amphetamine (ADDERALL) 30 mg tablet Take 30 mg by mouth every evening.       clonazePAM (KLONOPIN) 2 mg tablet Take 1 Tab by mouth three (3) times daily. (Patient taking differently: Take 2 mg by mouth two (2) times a day.) 3 Tab 0    tiZANidine (ZANAFLEX) 4 mg tablet as needed.  naproxen (NAPROSYN) 500 mg tablet Take 1 Tab by mouth every twelve (12) hours as needed for Pain. 30 Tab 0    multivitamin with iron (FLINTSTONES) chewable tablet Take 2 Tabs by mouth daily.  acetaminophen (TYLENOL) 325 mg tablet Take  by mouth every four (4) hours as needed for Pain.  zolpidem (AMBIEN) 10 mg tablet Take 10 mg by mouth nightly as needed for Sleep.  Omeprazole delayed release (PRILOSEC D/R) 20 mg tablet Take 20 mg by mouth every evening.        No Known Allergies  Family History   Problem Relation Age of Onset   24 \[facility] [place] [facility]\"" Cancer Mother     Diabetes Father     Cancer Other     Diabetes Other      Social History   Substance Use Topics    Smoking status: Never Smoker    Smokeless tobacco: Never Used    Alcohol use No      Comment: rarely     Patient Active Problem List   Diagnosis Code    ED (erectile dysfunction) N52.9    GERD (gastroesophageal reflux disease) K21.9    Scoliosis M41.9    Depression with anxiety F41.8    Bipolar 2 disorder ([facility]) F31.81    ADD (attention deficit disorder) F98.8    Insomnia G47.00    IGT (impair glucose tolerance)     Hyperlipidemia LDL goal < 100 E78.5    Restrictive airway disease J98.4    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    Midline low back pain without sciatica M54.5    Pain of right lower extremity M79.604    Right-sided thoracic back pain M54.6    Chronic lumbar pain M54.5, G89.29    Advance directive discussed with patient Z71.89    Severe obesity (BMI 35.0-39.9) ([facility]) E66.01    Osteoarthritis M19.90    Osteoarthritis of back M47.815    Osteoarthritis of both knees M17.0    Obesity, Class II, BMI 35-39.9, with comorbidity ([facility]) E66.9    PTSD (post-traumatic stress disorder) F43.10    HTN (hypertension) I10    Pure hypercholesterolemia E78.00    Spine pain M54.9    Fibromyalgia M79.7       Depression Risk Factor Screening:     PHQ over the last two weeks 9/9/2016   Little interest or pleasure in doing things Not at all   Feeling down, depressed or hopeless -   Total Score PHQ 2 -       Functional Ability and Level of Safety:       Activities of Daily Living   Self-care. Requires assistance with: no ADLs    ADL Assessment 6/27/2017   Feeding yourself No Help Needed   Getting from bed to chair No Help Needed   Getting dressed No Help Needed   Bathing or showering No Help Needed   Walk across the room (includes cane/walker) No Help Needed   Using the telphone No Help Needed   Taking your medications No Help Needed   Preparing meals No Help Needed   Managing money (expenses/bills) No Help Needed   Moderately strenuous housework (laundry) No Help Needed   Shopping for personal items (toiletries/medicines) No Help Needed   Shopping for groceries No Help Needed   Driving No Help Needed   Climbing a flight of stairs No Help Needed   Getting to places beyond walking distances No Help Needed       Fall Risk     Fall Risk Assessment, last 12 mths 6/27/2017   Able to walk? Yes   Fall in past 12 months? No     Abuse Screen   Patient is not abused    Abuse Screening Questionnaire 6/27/2017   Do you ever feel afraid of your partner? N   Are you in a relationship with someone who physically or mentally threatens you? N   Is it safe for you to go home? Y       Review of Systems   A comprehensive review of systems was negative except for that written in the HPI. Physical Examination     Evaluation of Cognitive Function:  Mood/affect:  neutral  Appearance: age appropriate, casually dressed and within normal Limits  Family member/caregiver input: not available. Blood pressure 120/80, pulse 73, temperature 97.7 °F (36.5 °C), temperature source Oral, resp. rate 16, height 6' 2\" (1.88 m), weight 212 lb 12.8 oz (96.5 kg), SpO2 100 %. For exam, see above.       Patient Care Team:  Refugio Carrero Colleen Traore MD as PCP - General (Internal Medicine)  Sarah Cancino MD (Orthopedic Surgery)  Monica Orozco, RN as Ambulatory Care Navigator (Internal Medicine)  Suzette Ignacio MD as Physician (Urology)  Randall Mejia MD (Physical Medicine and Rehab)  Janell Avalos, RN as [address] (Internal Medicine)    Advice/Referrals/Counseling   Education and counseling provided:  Are appropriate based on today's review and evaluation  Labs, and immunizations. Assessment/Plan   See above.

## 2017-06-27 NOTE — MR AVS SNAPSHOT
Visit Information Date & Time Provider Department Dept. Phone Encounter #  
 6/27/2017 10:30 AM Miguelito Bell [facility]  and [facility] [phone] [id number] Follow-up Instructions Return in about 1 week (around 7/4/2017) for lab review, referral follow-up. Upcoming Health Maintenance Date Due DTaP/Tdap/Td series (1 - Tdap) 6/18/1998 MEDICARE YEARLY EXAM 3/23/2017 INFLUENZA AGE 9 TO ADULT 8/1/2017 Allergies as of 6/27/2017  Review Complete On: 6/27/2017 By: Nataly Castañeda MD  
 No Known Allergies Current Immunizations  Reviewed on 6/27/2017 Name Date Tdap  Incomplete Reviewed by Nataly Castañeda MD on 6/27/2017 at 11:40 AM  
You Were Diagnosed With   
  
 Codes Comments Erectile dysfunction, unspecified erectile dysfunction type    -  Primary ICD-10-CM: N52.9 ICD-9-CM: 607.84 Pit[facility]     ICD-10-CM: D35.2 ICD-9-CM: 227.3 Chronic pain of left ankle     ICD-10-CM: M25.572, G89.29 ICD-9-CM: 719.47, 338.29 Dermatitis     ICD-10-CM: L30.9 ICD-9-CM: 692.9 Routine general medical examination at a health care facility     ICD-10-CM: Z00.00 ICD-9-CM: V70.0 Encounter for immunization     ICD-10-CM: H04 ICD-9-CM: V03.89 Routine screening for STI (sexually transmitted infection)     ICD-10-CM: Z11.3 ICD-9-CM: V74.5 Vitals BP Pulse Temp Resp Height(growth percentile) Weight(growth percentile) 120/80 (BP 1 Location: Left arm, BP Patient Position: Sitting) 73 97.7 °F (36.5 °C) (Oral) 16 6' 2\" (1.88 m) 212 lb 12.8 oz (96.5 kg) SpO2 BMI Smoking Status 100% 27.32 kg/m2 Never Smoker BMI and BSA Data Body Mass Index Body Surface Area  
 27.32 kg/m 2 2.24 m 2 Preferred Pharmacy Pharmacy Name Phone [address] [facility] [phone] Your Updated Medication List  
  
   
 This list is accurate as of: 6/27/17 11:55 AM.  Always use your most recent med list.  
  
  
  
  
 acetaminophen 325 mg tablet Commonly known as:  TYLENOL Take  by mouth every four (4) hours as needed for Pain. ascorbic acid (vitamin C) 500 mg tablet Commonly known as:  VITAMIN C Take 2,000 mg by mouth daily. b complex vitamins tablet Take 1 Tab by mouth daily. cholecalciferol (VITAMIN D3) 5,000 unit Tab tablet Commonly known as:  VITAMIN D3 Take 5,000 Units by mouth daily. clonazePAM 2 mg tablet Commonly known as:  Alkathy Sparrow Take 1 Tab by mouth three (3) times daily. cyanocobalamin 1,000 mcg tablet Take 5,000 mcg by mouth daily. dextroamphetamine-amphetamine 30 mg tablet Commonly known as:  ADDERALL Take 30 mg by mouth every evening.  
  
 gabapentin 300 mg capsule Commonly known as:  NEURONTIN  
1 Cap daily. Lisdexamfetamine 70 mg capsule Commonly known as:  VYVANSE Take 70 mg by mouth every morning. Indications: ATTENTION-DEFICIT HYPERACTIVITY DISORDER  
  
 lurasidone 60 mg Tab tablet Commonly known as:  Euell Tristin Take 30 mg by mouth nightly. Indications: DEPRESSION ASSOCIATED WITH BIPOLAR DISORDER  
  
 multivitamin tablet Commonly known as:  ONE A DAY Take 1 Tab by mouth daily. multivitamin with iron chewable tablet Commonly known as:  Javier Patria Take 2 Tabs by mouth daily. naproxen 500 mg tablet Commonly known as:  NAPROSYN Take 1 Tab by mouth every twelve (12) hours as needed for Pain. Omeprazole delayed release 20 mg tablet Commonly known as:  PRILOSEC D/R Take 20 mg by mouth every evening. PROBIOTIC PO Take  by mouth daily. tadalafil 20 mg tablet Commonly known as:  CIALIS Take 1 Tab by mouth as needed. 90 day supply tiZANidine 4 mg tablet Commonly known as:  Tejal Barroso  
as needed. traZODone 300 mg tablet Commonly known as:  Cristina Melendrez Take 150 mg by mouth nightly. zolpidem 10 mg tablet Commonly known as:  AMBIEN Take 10 mg by mouth nightly as needed for Sleep. Prescriptions Sent to Pharmacy Refills  
 tadalafil (CIALIS) 20 mg tablet 3 Sig: Take 1 Tab by mouth as needed. 90 day supply Class: Normal  
 Pharmacy: [address], [address] [place] [address] #: [phone] Route: Oral  
  
We Performed the Following CBC WITH AUTOMATED DIFF [85241 CPT(R)] CHLAMYDIA/NEISSERIA BY CHRISTIANA W/REFLEX CONFIRM [[id number] Custom] [facility] AND LH [97464 CPT(R)] HCV AB W/RFLX TO CHRISTIANA [43967 CPT(R)] HEMOGLOBIN A1C WITH EAG [50966 CPT(R)] HEP B SURFACE AB [id number] CPT(R)] HEP B SURFACE AG [id number] CPT(R)] HEPATITIS B CORE AB, TOTAL Q7870006 CPT(R)] HIV 1/2 AG/AB, 4TH GENERATION,W RFLX CONFIRM [[id number] Custom] LIPID PANEL [31750 CPT(R)] METABOLIC PANEL, COMPREHENSIVE [18833 CPT(R)] PROLACTIN [00692 CPT(R)] REFERRAL TO DERMATOLOGY [REF19 Custom] Comments:  
 Please evaluate patient for truncal rash x 3mo. REFERRAL TO ENDOCRINOLOGY [MUW58 Custom] Comments:  
 Please evaluate patient for pituitary adenoma evaluation. REFERRAL TO SPORTS MEDICINE [[id number] Custom] Comments:  
 Please evaluate patient for left achilles pain. T PALLIDUM SCREEN W/REFLEX [[id number] Custom] T4, FREE [id number] CPT(R)] TESTOSTERONE, FREE & TOTAL [03098 CPT(R)] TETANUS, DIPHTHERIA TOXOIDS AND ACELLULAR PERTUSSIS VACCINE (TDAP), IN INDIVIDS. >=7, IM [id number] CPT(R)] TSH 3RD GENERATION [62728 CPT(R)] Follow-up Instructions Return in about 1 week (around 7/4/2017) for lab review, referral follow-up. To-Do List   
 07/04/2017 Imaging:  MRI BRAIN W WO CONT Referral Information Referral ID Referred By Referred To  
  
 [id number] Brea Galeas MD   
   . Nila Naranjo [facility] [address] Phone: [phone] Fax: [phone] Visits Status Start Date End Date 1 New Request 6/27/17 6/27/18 If your referral has a status of pending review or denied, additional information will be sent to support the outcome of this decision. Referral ID Referred By Referred To  
 [id number] Angi Horn MD  
   [address] [address] Final Phone: [phone] Fax: [phone] Visits Status Start Date End Date 1 New Request 6/27/17 6/27/18 If your referral has a status of pending review or denied, additional information will be sent to support the outcome of this decision. Referral ID Referred By Referred To  
 [id number] Laura Vázquez MD  
   [address] [facility] [address] [place], [address] [facility] Phone: [phone] Fax: [phone] Visits Status Start Date End Date 1 New Request 6/27/17 6/27/18 If your referral has a status of pending review or denied, additional information will be sent to support the outcome of this decision. Patient Instructions You can call [phone] for Central Scheduling. They can help you schedule the MRI. You will typically be called in 2-3 business days to schedule. If you have problems scheduling, please call here to speak with someone in our clinic. Eladio Ma 4851 What is a living will? A living will is a legal form you use to write down the kind of care you want at the end of your life. It is used by the health professionals who will treat you if you aren't able to decide for yourself. If you put your wishes in writing, your loved ones and others will know what kind of care you want. They won't need to guess. This can ease your mind and be helpful to others. A living will is not the same as an estate or property will. An estate will explains what you want to happen with your money and property after you die. Is a living will a legal document? A living will is a legal document. Each state has its own laws about living roberts. If you move to another state, make sure that your living will is legal in the state where you now live. Or you might use a universal form that has been approved by many states. This kind of form can sometimes be completed and stored online. Your electronic copy will then be available wherever you have a connection to the Internet. In most cases, doctors will respect your wishes even if you have a form from a different state. · You don't need an  to complete a living will. But legal advice can be helpful if your state's laws are unclear, your health history is complicated, or your family can't agree on what should be in your living will. · You can change your living will at any time. Some people find that their wishes about end-of-life care change as their health changes. · In addition to making a living will, think about completing a medical power of  form. This form lets you name the person you want to make end-of-life treatment decisions for you (your \"health care agent\") if you're not able to. Many hospitals and nursing homes will give you the forms you need to complete a living will and a medical power of . · Your living will is used only if you can't make or communicate decisions for yourself anymore. If you become able to make decisions again, you can accept or refuse any treatment, no matter what you wrote in your living will. · Your state may offer an online registry. This is a place where you can store your living will online so the doctors and nurses who need to treat you can find it right away. What should you think about when creating a living will? Talk about your end-of-life wishes with your family members and your doctor. Let them know what you want. That way the people making decisions for you won't be surprised by your choices. Think about these questions as you make your living will: · Do you know enough about life support methods that might be used? If not, talk to your doctor so you know what might be done if you can't breathe on your own, your heart stops, or you're unable to swallow. · What things would you still want to be able to do after you receive life-support methods? Would you want to be able to walk? To speak? To eat on your own? To live without the help of machines? · If you have a choice, where do you want to be cared for? In your home? At a hospital or nursing home? · Do you want certain Zoroastrian practices performed if you become very ill? · If you have a choice at the end of your life, where would you prefer to die? At home? In a hospital or nursing home? Somewhere else? · Would you prefer to be buried or cremated? · Do you want your organs to be donated after you die? What should you do with your living will? · Make sure that your family members and your health care agent have copies of your living will. · Give your doctor a copy of your living will to keep in your medical record. If you have more than one doctor, make sure that each one has a copy. · You may want to put a copy of your living will where it can be easily found. Where can you learn more? Go to http://yuri-olamide.info/. Enter O541 in the search box to learn more about \"Learning About Living Perrosindy. \" Current as of: August 8, 2016 Content Version: 11.3 © 0294-0403 Healthwise, Incorporated. Care instructions adapted under license by [employer] (which disclaims liability or warranty for this information). If you have questions about a medical condition or this instruction, always ask your healthcare professional. [place] [address] any warranty or liability for your use of this information. Introducing [facility] & HEALTH SERVICES! Dear Marielos Tadeo: Thank you for requesting a Rubikloud account. Our records indicate that you already have an active Rubikloud account. You can access your account anytime at [email or web address] Did you know that you can access your hospital and ER discharge instructions at any time in Rubikloud? You can also review all of your test results from your hospital stay or ER visit. Additional Information If you have questions, please visit the Frequently Asked Questions section of the Rubikloud website at [email or web address] Remember, Rubikloud is NOT to be used for urgent needs. For medical emergencies, dial 911. Now available from your iPhone and Android! Please provide this summary of care documentation to your next provider. Your primary care clinician is listed as [address]. If you have any questions after today's visit, please call [phone].

## 2017-06-27 NOTE — PROGRESS NOTES
RM 17  Pt has been fasting today  Pt states he has been having frequent urination day and night for 1 year . Chief Complaint   Patient presents with   [facility] Annual Wellness Visit    Labs    Medication Refill    Foot Pain     left heel pain x 7-8 months       1. Have you been to the ER, urgent care clinic since your last visit? Hospitalized since your last visit? No    2. Have you seen or consulted any other health care providers outside of the [address] [place] [address] since your last visit? Include any pap smears or colon screening.  Yes Where: [facility] weekly x 1 year    Health Maintenance Due   Topic Date Due    DTaP/Tdap/Td series (1 - Tdap) 06/18/1998    MEDICARE YEARLY EXAM  03/23/2017   Here today for [facility]    Living Will sent to pt AVS

## 2017-06-29 ENCOUNTER — HOSPITAL ENCOUNTER (OUTPATIENT)
Dept: MRI IMAGING | Age: 42
Discharge: HOME | End: 2017-06-29
Attending: INTERNAL MEDICINE
Payer: MEDICARE

## 2017-06-29 DIAGNOSIS — D35.2 PITUITARY ADENOMA (HCC): ICD-10-CM

## 2017-06-29 LAB
ALBUMIN SERPL-MCNC: 4.9 G/DL (ref 3.5–5.5)
ALBUMIN/GLOB SERPL: 2.1 {RATIO} (ref 1.2–2.2)
ALP SERPL-CCNC: 85 IU/L (ref 39–117)
ALT SERPL-CCNC: 20 IU/L (ref 0–44)
AST SERPL-CCNC: 15 IU/L (ref 0–40)
BASOPHILS # BLD AUTO: 0 X10E3/UL (ref 0–0.2)
BASOPHILS NFR BLD AUTO: 0 %
BILIRUB SERPL-MCNC: 0.3 MG/DL (ref 0–1.2)
BUN SERPL-MCNC: 25 MG/DL (ref 6–24)
BUN/CREAT SERPL: 22 (ref 9–20)
C TRACH RRNA SPEC QL NAA+PROBE: NEGATIVE
CALCIUM SERPL-MCNC: 9.6 MG/DL (ref 8.7–10.2)
CHLORIDE SERPL-SCNC: 98 MMOL/L (ref 96–106)
CHOLEST SERPL-MCNC: 130 MG/DL (ref 100–199)
CO2 SERPL-SCNC: 25 MMOL/L (ref 18–29)
COMMENT: NORMAL
CREAT SERPL-MCNC: 1.15 MG/DL (ref 0.76–1.27)
EOSINOPHIL # BLD AUTO: 0 X10E3/UL (ref 0–0.4)
EOSINOPHIL NFR BLD AUTO: 0 %
ERYTHROCYTE [DISTWIDTH] IN BLOOD BY AUTOMATED COUNT: 14.1 % (ref 12.3–15.4)
EST. AVERAGE GLUCOSE BLD GHB EST-MCNC: 105 MG/DL
FSH SERPL-ACNC: 9.9 MIU/ML (ref 1.5–12.4)
GLOBULIN SER CALC-MCNC: 2.3 G/DL (ref 1.5–4.5)
GLUCOSE SERPL-MCNC: 88 MG/DL (ref 65–99)
HBA1C MFR BLD: 5.3 % (ref 4.8–5.6)
HBV CORE AB SERPL QL IA: NEGATIVE
HBV SURFACE AB SER QL: NON REACTIVE
HBV SURFACE AG SERPL QL IA: NEGATIVE
HCT VFR BLD AUTO: 43.9 % (ref 37.5–51)
HCV AB S/CO SERPL IA: <0.1 S/CO RATIO (ref 0–0.9)
HCV AB SERPL QL IA: NORMAL
HDLC SERPL-MCNC: 68 MG/DL
HGB BLD-MCNC: 14.6 G/DL (ref 12.6–17.7)
HIV 1+2 AB+HIV1 P24 AG SERPL QL IA: NON REACTIVE
IMM GRANULOCYTES # BLD: 0 X10E3/UL (ref 0–0.1)
IMM GRANULOCYTES NFR BLD: 0 %
LDLC SERPL CALC-MCNC: 47 MG/DL (ref 0–99)
LH SERPL-ACNC: 7.8 MIU/ML (ref 1.7–8.6)
LYMPHOCYTES # BLD AUTO: 2 X10E3/UL (ref 0.7–3.1)
LYMPHOCYTES NFR BLD AUTO: 43 %
MCH RBC QN AUTO: 26 PG (ref 26.6–33)
MCHC RBC AUTO-ENTMCNC: 33.3 G/DL (ref 31.5–35.7)
MCV RBC AUTO: 78 FL (ref 79–97)
MONOCYTES # BLD AUTO: 0.3 X10E3/UL (ref 0.1–0.9)
MONOCYTES NFR BLD AUTO: 6 %
N GONORRHOEA RRNA SPEC QL NAA+PROBE: NEGATIVE
NEUTROPHILS # BLD AUTO: 2.3 X10E3/UL (ref 1.4–7)
NEUTROPHILS NFR BLD AUTO: 51 %
PLATELET # BLD AUTO: 216 X10E3/UL (ref 150–379)
POTASSIUM SERPL-SCNC: 4.3 MMOL/L (ref 3.5–5.2)
PROLACTIN SERPL-MCNC: 10.4 NG/ML (ref 4–15.2)
PROT SERPL-MCNC: 7.2 G/DL (ref 6–8.5)
RBC # BLD AUTO: 5.62 X10E6/UL (ref 4.14–5.8)
SODIUM SERPL-SCNC: 139 MMOL/L (ref 134–144)
T PALLIDUM AB SER QL IA: NEGATIVE
T4 FREE SERPL-MCNC: 1.45 NG/DL (ref 0.82–1.77)
TESTOST FREE SERPL-MCNC: 14.2 PG/ML (ref 6.8–21.5)
TESTOST SERPL-MCNC: 530 NG/DL (ref 348–1197)
TRIGL SERPL-MCNC: 76 MG/DL (ref 0–149)
TSH SERPL DL<=0.005 MIU/L-ACNC: 2.36 UIU/ML (ref 0.45–4.5)
VLDLC SERPL CALC-MCNC: 15 MG/DL (ref 5–40)
WBC # BLD AUTO: 4.5 X10E3/UL (ref 3.4–10.8)

## 2017-06-29 PROCEDURE — 70553 MRI BRAIN STEM W/O & W/DYE: CPT

## 2017-06-29 PROCEDURE — 74011250636 HC RX REV CODE- 250/636: Performed by: INTERNAL MEDICINE

## 2017-06-29 PROCEDURE — A9577 INJ MULTIHANCE: HCPCS | Performed by: INTERNAL MEDICINE

## 2017-06-29 RX ADMIN — GADOBENATE DIMEGLUMINE 20 ML: 529 INJECTION, SOLUTION INTRAVENOUS at 17:01

## 2017-07-10 ENCOUNTER — OFFICE VISIT (OUTPATIENT)
Dept: INTERNAL MEDICINE CLINIC | Age: 42
End: 2017-07-10

## 2017-07-10 VITALS
TEMPERATURE: 97.8 F | DIASTOLIC BLOOD PRESSURE: 76 MMHG | HEIGHT: 74 IN | OXYGEN SATURATION: 100 % | WEIGHT: 212.4 LBS | RESPIRATION RATE: 16 BRPM | SYSTOLIC BLOOD PRESSURE: 106 MMHG | HEART RATE: 74 BPM | BODY MASS INDEX: 27.26 KG/M2

## 2017-07-10 DIAGNOSIS — D35.2 PITUITARY MICROADENOMA (HCC): Primary | ICD-10-CM

## 2017-07-10 NOTE — PROGRESS NOTES
RM 16    Chief Complaint   Patient presents with    Labs     review    Referral Follow Up     pt has questions and review MRI        1. Have you been to the ER, urgent care clinic since your last visit? Hospitalized since your last visit? No    2. Have you seen or consulted any other health care providers outside of the [address] [place] [address] since your last visit? Include any pap smears or colon screening. No    There are no preventive care reminders to display for this patient.     There is no HM due for this pt

## 2017-07-10 NOTE — MR AVS SNAPSHOT
Visit Information Date & Time Provider Department Dept. Phone Encounter #  
 7/10/2017 11:00 AM Luis Baltazar, [address] [place] [address] and Internal Medicine [phone] [id number] Follow-up Instructions Return in about 1 year (around 7/10/2018) for yearly physical; as needed for referral follow-up. Upcoming Health Maintenance Date Due INFLUENZA AGE 9 TO ADULT 8/1/2017 MEDICARE YEARLY EXAM 6/28/2018 DTaP/Tdap/Td series (2 - Td) 6/27/2027 Allergies as of 7/10/2017  Review Complete On: 7/10/2017 By: Luis Baltazar MD  
 No Known Allergies Current Immunizations  Reviewed on 6/27/2017 Name Date Tdap 6/27/2017 Not reviewed this visit You Were Diagnosed With   
  
 Codes Comments [facility]aden[facility]    -  Primary ICD-10-CM: D35.2 ICD-9-CM: 227.3 Vitals BP Pulse Temp Resp Height(growth percentile) Weight(growth percentile) 106/76 (BP 1 Location: Left arm, BP Patient Position: Sitting) 74 97.8 °F (36.6 °C) (Oral) 16 6' 2\" (1.88 m) 212 lb 6.4 oz (96.3 kg) SpO2 BMI Smoking Status 100% 27.27 kg/m2 Never Smoker BMI and BSA Data Body Mass Index Body Surface Area  
 27.27 kg/m 2 2.24 m 2 Preferred Pharmacy Pharmacy Name Phone 100 [facility] [phone] Your Updated Medication List  
  
   
This list is accurate as of: 7/10/17 12:34 PM.  Always use your most recent med list.  
  
  
  
  
 ascorbic acid (vitamin C) 500 mg tablet Commonly known as:  VITAMIN C Take 2,000 mg by mouth daily. b complex vitamins tablet Take 1 Tab by mouth daily. cholecalciferol (VITAMIN D3) 5,000 unit Tab tablet Commonly known as:  VITAMIN D3 Take 5,000 Units by mouth daily. clonazePAM 2 mg tablet Commonly known as:  Nikky Carcamo Take 1 Tab by mouth three (3) times daily. cyanocobalamin 1,000 mcg tablet Take 5,000 mcg by mouth daily. dextroamphetamine-amphetamine 30 mg tablet Commonly known as:  ADDERALL Take 30 mg by mouth every evening.  
  
 gabapentin 300 mg capsule Commonly known as:  NEURONTIN  
1 Cap daily. Lisdexamfetamine 70 mg capsule Commonly known as:  VYVANSE Take 70 mg by mouth every morning. Indications: ATTENTION-DEFICIT HYPERACTIVITY DISORDER  
  
 lurasidone 60 mg Tab tablet Commonly known as:  Rhina Durie Take 30 mg by mouth nightly. Indications: DEPRESSION ASSOCIATED WITH BIPOLAR DISORDER  
  
 multivitamin tablet Commonly known as:  ONE A DAY Take 1 Tab by mouth daily. PROBIOTIC PO Take  by mouth daily. tadalafil 20 mg tablet Commonly known as:  CIALIS Take 1 Tab by mouth as needed. 90 day supply  
  
 traZODone 300 mg tablet Commonly known as:  Adriana Deeds Take 150 mg by mouth nightly. Follow-up Instructions Return in about 1 year (around 7/10/2018) for yearly physical; as needed for referral follow-up. Patient Instructions Please schedule evaluation with endocrine for \"pituitary micro-adenoma\". Let them know all your labs and recent MRI imaging are already in the [facility]. If problems with scheduling, please let us know. Introducing [facility] & HEALTH SERVICES! Dear Sancho Esteban: 
Thank you for requesting a EventBuilder account. Our records indicate that you already have an active EventBuilder account. You can access your account anytime at [email or web address][email or web address]. [employer]/"[employer] Did you know that you can access your hospital and ER discharge instructions at any time in EventBuilder? You can also review all of your test results from your hospital stay or ER visit. Additional Information If you have questions, please visit the Frequently Asked Questions section of the EventBuilder website at [email or web address]. [employer]/. Remember, EventBuilder is NOT to be used for urgent needs. For medical emergencies, dial 911. Now available from your iPhone and Android! Please provide this summary of care documentation to your next provider. Your primary care clinician is listed as [address] [facility]. If you have any questions after today's visit, please call [phone].

## 2017-07-10 NOTE — PROGRESS NOTES
HISTORY OF PRESENT ILLNESS  Collette Mary is a 38 y.o. male. HPI  Here for lab follow-up and MRI review. Reviewed imaging and labs with pt at visit. Paper copies provided to pt. Referral coordination reviewed with pt at visit. ROS      Blood pressure 106/76, pulse 74, temperature 97.8 °F (36.6 °C), temperature source Oral, resp. rate 16, height 6' 2\" (1.88 m), weight 212 lb 6.4 oz (96.3 kg), SpO2 100 %. Physical Exam   Constitutional: He appears well-developed and well-nourished. No distress. HENT:   Head: Normocephalic and atraumatic. Eyes: Conjunctivae are normal. Right eye exhibits no discharge. Left eye exhibits no discharge. No scleral icterus. Neck: Normal range of motion. Neck supple. Cardiovascular: Normal rate, regular rhythm, normal heart sounds and intact distal pulses. Exam reveals no gallop and no friction rub. No murmur heard. Pulmonary/Chest: Effort normal and breath sounds normal. No respiratory distress. He has no wheezes. He has no rales. Abdominal: Soft. Bowel sounds are normal. He exhibits no distension. There is no tenderness. Musculoskeletal: He exhibits no edema or tenderness. Neurological: He is alert. He exhibits normal muscle tone. Coordination normal.   Skin: Skin is warm. No rash noted. He is not diaphoretic. No erythema. No pallor. Psychiatric: He has a normal mood and affect. His behavior is normal. Judgment and thought content normal.       ASSESSMENT and PLAN    ICD-10-CM ICD-9-CM    1. Pituitary microadenoma (HCC) D35.2 227.3        Labs, imaging, endocrine evaluation reviewed with pt at visit.       Follow-up Disposition:  Return in about 1 year (around 7/10/2018) for yearly physical; as needed for referral follow-up.  lab results and schedule of future lab studies reviewed with patient  reviewed medications and side effects in detail  radiology results and schedule of future radiology studies reviewed with patient    For additional documentation of information and/or recommendations discussed this visit, please see notes in instructions. Plan and evaluation (above) reviewed with pt at visit  Patient voiced understanding of plan and provided with time to ask/review questions. After Visit Summary (AVS) provided to pt after visit with additional instructions as needed/reviewed. Greater than 50% of the time in this 30min visit was spent by me in face-to-face counseling and coordination of care--reviewing imaging, referral, labs and significnace.

## 2017-07-10 NOTE — PATIENT INSTRUCTIONS
Please schedule evaluation with endocrine for \"pituitary micro-adenoma\". Let them know all your labs and recent MRI imaging are already in the [facility]. If problems with scheduling, please let us know.

## 2017-07-12 ENCOUNTER — APPOINTMENT (OUTPATIENT)
Dept: GENERAL RADIOLOGY | Age: 42
End: 2017-07-12
Attending: SPECIALIST
Payer: MEDICARE

## 2017-07-12 ENCOUNTER — HOSPITAL ENCOUNTER (OUTPATIENT)
Age: 42
Setting detail: OUTPATIENT SURGERY
Discharge: HOME | End: 2017-07-12
Attending: SPECIALIST | Admitting: SPECIALIST
Payer: MEDICARE

## 2017-07-12 VITALS
RESPIRATION RATE: 14 BRPM | DIASTOLIC BLOOD PRESSURE: 104 MMHG | OXYGEN SATURATION: 100 % | WEIGHT: 209.3 LBS | HEART RATE: 99 BPM | TEMPERATURE: 97.8 F | BODY MASS INDEX: 26.87 KG/M2 | SYSTOLIC BLOOD PRESSURE: 152 MMHG

## 2017-07-12 DIAGNOSIS — Z46.51 FITTING AND ADJUSTMENT OF GASTRIC LAP BAND: ICD-10-CM

## 2017-07-12 PROCEDURE — 76000 FLUOROSCOPY <1 HR PHYS/QHP: CPT

## 2017-07-12 PROCEDURE — 43999 UNLISTED PROCEDURE STOMACH: CPT | Performed by: SPECIALIST

## 2017-07-12 PROCEDURE — 74011000255 HC RX REV CODE- 255: Performed by: SPECIALIST

## 2017-07-12 PROCEDURE — 74011000250 HC RX REV CODE- 250: Performed by: SPECIALIST

## 2017-07-12 RX ORDER — LIDOCAINE HYDROCHLORIDE 10 MG/ML
INJECTION INFILTRATION; PERINEURAL AS NEEDED
Status: DISCONTINUED | OUTPATIENT
Start: 2017-07-12 | End: 2017-07-12 | Stop reason: HOSPADM

## 2017-07-12 NOTE — IP AVS SNAPSHOT
Current Discharge Medication List  
  
ASK your doctor about these medications Dose & Instructions Dispensing Information Comments Morning Noon Evening Bedtime  
 ascorbic acid (vitamin C) 500 mg tablet Commonly known as:  VITAMIN C Your last dose was: Your next dose is:    
   
   
 Dose:  2000 mg Take 2,000 mg by mouth daily. Refills:  0  
     
   
   
   
  
 b complex vitamins tablet Your last dose was: Your next dose is:    
   
   
 Dose:  1 Tab Take 1 Tab by mouth daily. Refills:  0  
     
   
   
   
  
 cholecalciferol (VITAMIN D3) 5,000 unit Tab tablet Commonly known as:  VITAMIN D3 Your last dose was: Your next dose is:    
   
   
 Dose:  5000 Units Take 5,000 Units by mouth daily. Refills:  0  
     
   
   
   
  
 clonazePAM 2 mg tablet Commonly known as:  Evlyn Keith Your last dose was: Your next dose is:    
   
   
 Dose:  2 mg Take 1 Tab by mouth three (3) times daily. Quantity:  3 Tab Refills:  0  
     
   
   
   
  
 cyanocobalamin 1,000 mcg tablet Your last dose was: Your next dose is:    
   
   
 Dose:  5000 mcg Take 5,000 mcg by mouth daily. Refills:  0  
     
   
   
   
  
 dextroamphetamine-amphetamine 30 mg tablet Commonly known as:  ADDERALL Your last dose was: Your next dose is:    
   
   
 Dose:  30 mg Take 30 mg by mouth every evening. Refills:  0  
     
   
   
   
  
 gabapentin 300 mg capsule Commonly known as:  NEURONTIN Your last dose was: Your next dose is:    
   
   
 Dose:  1 Cap 1 Cap daily. Refills:  0 Lisdexamfetamine 70 mg capsule Commonly known as:  VYVANSE Your last dose was: Your next dose is:    
   
   
 Dose:  70 mg Take 70 mg by mouth every morning. Indications: ATTENTION-DEFICIT HYPERACTIVITY DISORDER Refills:  0 lurasidone 60 mg Tab tablet Commonly known as:  Lexi Boone Your last dose was: Your next dose is:    
   
   
 Dose:  30 mg Take 30 mg by mouth nightly. Indications: DEPRESSION ASSOCIATED WITH BIPOLAR DISORDER Refills:  0  
     
   
   
   
  
 multivitamin tablet Commonly known as:  ONE A DAY Your last dose was: Your next dose is:    
   
   
 Dose:  1 Tab Take 1 Tab by mouth daily. Refills:  0 PROBIOTIC PO Your last dose was: Your next dose is: Take  by mouth daily. Refills:  0  
     
   
   
   
  
 tadalafil 20 mg tablet Commonly known as:  CIALIS Your last dose was: Your next dose is:    
   
   
 Dose:  20 mg Take 1 Tab by mouth as needed. 90 day supply Quantity:  18 Tab Refills:  3  
     
   
   
   
  
 traZODone 300 mg tablet Commonly known as:  Adriana Trevino Your last dose was: Your next dose is:    
   
   
 Dose:  150 mg Take 150 mg by mouth nightly. Refills:  0

## 2017-07-12 NOTE — PROCEDURES
Lap Band Encounter (fluroscopy clinic)    Casandra Gordillo is gastric banding patient who had his procedure on 07/26/16.  his weight today is 94.9 kg (209 lb 4.8 oz), which correlates to  % EBW loss. he is here today for Lap Band Adjustment / Fill with Fluoroscopy Guidance. he notes the following issues related to the banding procedure; - here for routine adjustment.       Surgery related complications; NA    Visit Vitals    BP (!) 152/104    Pulse 99    Temp 97.8 °F (36.6 °C)    Resp 14    Wt 94.9 kg (209 lb 4.8 oz)    SpO2 100%    BMI 26.87 kg/m2       Past Medical History:   Diagnosis Date    Balance problems     Bipolar 2 disorder (HCC)     Chronic fatigue syndrome     Chronic pain     back pain worse    Depression     ED (erectile dysfunction)     Extremity pain     Fever and chills     Fibromyalgia     GERD (gastroesophageal reflux disease)     GERD (gastroesophageal reflux disease)     Headache since about 23y/o    excedrine migraine daily for HA multiple doses (3-4 daily) Neurologist 2008, had an MRI brain 2007    HTN (hypertension) 11/3/2010    no medication as of 4/2016    Hypertonicity of bladder     IGT (impair glucose tolerance) 12/2/2010    Insomnia     Insomnia     Neck pain     Night sweats     Obesity, Class II, BMI 35-39.9, with comorbidity (HCC)     Osteoarthritis of back     Osteoarthritis of both knees     PTSD (post-traumatic stress disorder)      related    Pure hypercholesterolemia 12/2/2010    RLS (restless legs syndrome)     Spine pain     Urge incontinence      Past Surgical History:   Procedure Laterality Date    HX OTHER SURGICAL  07/26/2016    pt states had \"lap band\" procedure    [place] LASER SURGERY OF EYE  4/2002    both     Current Facility-Administered Medications   Medication Dose Route Frequency Provider Last Rate Last Dose    barium sulfate (EZ PAQUE) 96% (w/w) contrast suspension    PRN Justyna Pierce MD   30 mL at 07/12/17 1402    lidocaine (XYLOCAINE) 10 mg/mL (1 %) injection    PRN Cammie Short MD   1.5 mL at 07/12/17 1402          Review of Symptoms:     General - No history or complaints of unexpected fever or chills  Cardiac - No history or complaints of chest pain, palpitations, or shortness of breath  Pulmonary - No history or complaints of shortness of breath or productive cough  Gastrointestinal - as noted above        Physical Exam:    General:  alert, cooperative, no distress, appears stated age   Abdomen:   abdomen is soft without significant tenderness, masses, organomegaly or guarding; port in place   Incisions: healing well, no significant drainage       Assessment:     1. History of Morbid obesity, status post gastric banding, given the fluro findings we will proceed with the following adjustment. Plan:     Previous Fill Volume:  9.0   Removed:       Total fill volume after today's adjustment:  10.3  Added:  1.3                Follow-up in PRN

## 2017-07-12 NOTE — IP AVS SNAPSHOT
Darrel Velazquez [facility] [address] 
[phone] Patient: Albaro Henson MRN: [id number] :1975 You are allergic to the following No active allergies Recent Documentation Weight BMI Smoking Status 94.9 kg 26.87 kg/m2 Never Smoker Emergency Contacts Name Discharge Info Relation Home Work Mobile Liliane Garcia DISCHARGE CAREGIVER [3] Friend [5] [phone] Kevin Miranda  Child [2]   [phone] RuthLv monge  Parent [1] [phone] Kiley Garcia  Other Relative [6] [phone] About your hospitalization You were admitted on:  2017 You last received care in the:  [facility] ENDOSCOPY You were discharged on:  2017 Unit phone number:  [phone] Why you were hospitalized Your primary diagnosis was:  Not on File Providers Seen During Your Hospitalizations Provider Role Specialty Primary office phone Juanis Lassiter MD Attending Provider General Surgery [phone] Your Primary Care Physician (PCP) Primary Care Physician Office Phone Office Fax Alejandra Chery [phone] Follow-up Information None Your Appointments  11:00 AM EDT New Patient with Navneet Whitfield MD  
[address] [place] [address] and Primary Care [facility] [id number]  
[phone] Current Discharge Medication List  
  
ASK your doctor about these medications Dose & Instructions Dispensing Information Comments Morning Noon Evening Bedtime  
 ascorbic acid (vitamin C) 500 mg tablet Commonly known as:  VITAMIN C Your last dose was: Your next dose is:    
   
   
 Dose:  2000 mg Take 2,000 mg by mouth daily. Refills:  0  
     
   
   
   
  
 b complex vitamins tablet Your last dose was: Your next dose is:    
   
   
 Dose:  1 Tab Take 1 Tab by mouth daily. Refills:  0  
     
   
   
   
  
 cholecalciferol (VITAMIN D3) 5,000 unit Tab tablet Commonly known as:  VITAMIN D3 Your last dose was: Your next dose is:    
   
   
 Dose:  5000 Units Take 5,000 Units by mouth daily. Refills:  0  
     
   
   
   
  
 clonazePAM 2 mg tablet Commonly known as:  Elidia Cord Your last dose was: Your next dose is:    
   
   
 Dose:  2 mg Take 1 Tab by mouth three (3) times daily. Quantity:  3 Tab Refills:  0  
     
   
   
   
  
 cyanocobalamin 1,000 mcg tablet Your last dose was: Your next dose is:    
   
   
 Dose:  5000 mcg Take 5,000 mcg by mouth daily. Refills:  0  
     
   
   
   
  
 dextroamphetamine-amphetamine 30 mg tablet Commonly known as:  ADDERALL Your last dose was: Your next dose is:    
   
   
 Dose:  30 mg Take 30 mg by mouth every evening. Refills:  0  
     
   
   
   
  
 gabapentin 300 mg capsule Commonly known as:  NEURONTIN Your last dose was: Your next dose is:    
   
   
 Dose:  1 Cap 1 Cap daily. Refills:  0 Lisdexamfetamine 70 mg capsule Commonly known as:  VYVANSE Your last dose was: Your next dose is:    
   
   
 Dose:  70 mg Take 70 mg by mouth every morning. Indications: ATTENTION-DEFICIT HYPERACTIVITY DISORDER Refills:  0  
     
   
   
   
  
 lurasidone 60 mg Tab tablet Commonly known as:  Alyssa Pa Your last dose was: Your next dose is:    
   
   
 Dose:  30 mg Take 30 mg by mouth nightly. Indications: DEPRESSION ASSOCIATED WITH BIPOLAR DISORDER Refills:  0  
     
   
   
   
  
 multivitamin tablet Commonly known as:  ONE A DAY Your last dose was: Your next dose is:    
   
   
 Dose:  1 Tab Take 1 Tab by mouth daily. Refills:  0 PROBIOTIC PO Your last dose was: Your next dose is: Take  by mouth daily. Refills:  0  
     
   
   
   
  
 tadalafil 20 mg tablet Commonly known as:  CIALIS Your last dose was: Your next dose is:    
   
   
 Dose:  20 mg Take 1 Tab by mouth as needed. 90 day supply Quantity:  18 Tab Refills:  3  
     
   
   
   
  
 traZODone 300 mg tablet Commonly known as:  Cristina Melendrez Your last dose was: Your next dose is:    
   
   
 Dose:  150 mg Take 150 mg by mouth nightly. Refills:  0 Discharge Instructions [address] Tanya Higuera [address] Medical Pavilion at [address], [address] 
[phone] Glory Hubbard, Registered Dietician;  [phone] To schedule your next adjustment, call Janeth Ortiz @ [phone] Post Adjustable Gastric Band Fill Instructions Your band may now be fairly tight and some swelling may occur at the band site following a fill. Therefore, you should: 
? Stay on liquids for 2 days ? Then on soft foods for 2 days ? Then resume regular foods All meals should fit into a 4 oz. (1/2 cup) container. Eating Tips: 
? Use a small plate ? Put your fork down between bites ? Eat slowly ? Do not eat and drink at the same time. Tips for Weight Loss: 
? Exercise and protein will  help increase and maintain and build muscle mass ? Exercise at least 30-40 min. each day. Include cardiovascular and resistance training. ? Drink at least 8 glasses of water every day. ? Take you multi-vitamins and B vitamins every day. ? Journal your food ? Keep carbohydrates less than 50Gm per day. Call for a band adjustment if: 
? You are losing less than 1 lb per week ? You are having increasing hunger between meals Call for evaluation if you develop reflux or inability to tolerate solid foods.  Your band may be too tight. Make sure you have a follow up appointment. Support Group Meetings [facility] 2nd Thursday every month 
6:00pm 
 
Discharge Orders None ACO Transitions of Care Introducing [employer] [address] Alison Tay offers a voluntary care coordination program to provide high quality service and care to [facility] fee-for-service beneficiaries. Robe Campbell was designed to help you enhance your health and well-being through the following services: ? Transitions of Care  support for individuals who are transitioning from one care setting to another (example: Hospital to home). ? Chronic and Complex Care Coordination  support for individuals and caregivers of those with serious or chronic illnesses or with more than one chronic (ongoing) condition and those who take a number of different medications. If you meet specific medical criteria, a [facility] [address] may call you directly to coordinate your care with your primary care physician and your other care providers. For questions about the [facility] programs, please, contact your physicians office. For general questions or additional information about Accountable Care Organizations: 
Please visit www.medicare.gov/acos. html or call 1-800-MEDICARE (5-924.987.5934) TTY users should call 4-388.922.2968. Introducing [facility]! Dear Sancho Esteban: 
Thank you for requesting a [employer] account. Our records indicate that you already have an active [employer] account. You can access your account anytime at [email or web address] Did you know that you can access your hospital and ER discharge instructions at any time in [employer]? You can also review all of your test results from your hospital stay or ER visit. Additional Information If you have questions, please visit the Frequently Asked Questions section of the [email or web address] website at [email or web address] Remember, MyChart is NOT to be used for urgent needs. For medical emergencies, dial 911. Now available from your iPhone and Android! General Information Please provide this summary of care documentation to your next provider. Patient Signature:  ____________________________________________________________ Date:  ____________________________________________________________  
  
Roxton Brake Provider Signature:  ____________________________________________________________ Date:  ____________________________________________________________

## 2017-07-12 NOTE — DISCHARGE INSTRUCTIONS
Audra Stanley  [address] [facility]. Medical Pavilion at [facility] FOR [facility] [address] [facility], [address] [place] [address] - [address]  [address] Yair Borrero, Registered Dietician;  [phone]    To schedule your next adjustment, call Megasaryari Estrada @ [phone]    Post Adjustable Gastric Band Fill Instructions    Your band may now be fairly tight and some swelling may occur at the band site following a fill. Therefore, you should:   Stay on liquids for 2 days   Then on soft foods for 2 days   Then resume regular foods    All meals should fit into a 4 oz. (1/2 cup) container. Eating Tips:   Use a small plate   Put your fork down between bites   Eat slowly   Do not eat and drink at the same time. Tips for Weight Loss:   Exercise and protein will  help increase and maintain and build muscle mass   Exercise at least 30-40 min. each day. Include cardiovascular and resistance training.  Drink at least 8 glasses of water every day.  Take you multi-vitamins and B vitamins every day.  Journal your food   Keep carbohydrates less than 50Gm per day. Call for a band adjustment if:   You are losing less than 1 lb per week   You are having increasing hunger between meals    Call for evaluation if you develop reflux or inability to tolerate solid foods. Your band may be too tight. Make sure you have a follow up appointment.         Support Group Meetings  [facility]  2nd Thursday every month  6:00pm

## 2017-07-17 ENCOUNTER — OFFICE VISIT (OUTPATIENT)
Dept: SURGERY | Age: 42
End: 2017-07-17

## 2017-07-17 VITALS
DIASTOLIC BLOOD PRESSURE: 86 MMHG | WEIGHT: 209 LBS | RESPIRATION RATE: 16 BRPM | BODY MASS INDEX: 26.82 KG/M2 | OXYGEN SATURATION: 100 % | SYSTOLIC BLOOD PRESSURE: 127 MMHG | HEART RATE: 83 BPM | HEIGHT: 74 IN

## 2017-07-17 DIAGNOSIS — R11.10 VOMITING, INTRACTABILITY OF VOMITING NOT SPECIFIED, PRESENCE OF NAUSEA NOT SPECIFIED, UNSPECIFIED VOMITING TYPE: Primary | ICD-10-CM

## 2017-07-17 NOTE — PROGRESS NOTES
Subjective:     Madison Ortega  is a 38 y.o. male who presents for follow-up about 1 year following laparoscopic adjustable gastric band surgery. He has lost a total of 46 pounds since surgery. Body mass index is 26.83 kg/(m^2). . EBWL is (55%). The patient presents today to assess their progress toward their goal of weight loss and to address any issues that may be present. Today the patient and I have reviewed their diet and how appropriate their food choices are. The following issues have been identified : too tight after last adjustment. .  Surgery related complication: none       He reports vomiting and denies abdominal pain. The patients diet choices have been reviewed today and are poor this past weekend. Patients pain score:0    The patient's exercise level: very active or exercises 4 times a week. Changes in his medical history and medications have been reviewed.     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    Midline low back pain without sciatica M54.5    Pain of right lower extremity M79.604    Right-sided thoracic back pain M54.6    Chronic lumbar pain M54.5, G89.29    Advance directive discussed with patient Z70.80    Osteoarthritis M19.90    Osteoarthritis of back M47.815    Osteoarthritis of both knees M17.0    PTSD (post-traumatic stress disorder) F43.10    Spine pain M54.9    Fibromyalgia M79.7     Past Medical History:   Diagnosis Date    Balance problems     Bipolar 2 disorder (HCC)     Chronic fatigue syndrome     Chronic pain     back pain worse    Depression     ED (erectile dysfunction)     Extremity pain     Fever and chills     Fibromyalgia     GERD (gastroesophageal reflux disease)     GERD (gastroesophageal reflux disease)     Headache since about 23y/o    excedrine migraine daily for HA multiple doses (3-4 daily) Neurologist 2008, had an MRI brain 2007    HTN (hypertension) 11/3/2010    no medication as of 4/2016    Hypertonicity of bladder     IGT (impair glucose tolerance) 12/2/2010    Insomnia     Insomnia     Neck pain     Night sweats     Obesity, Class II, BMI 35-39.9, with comorbidity (HCC)     Osteoarthritis of back     Osteoarthritis of both knees     PTSD (post-traumatic stress disorder)      related    Pure hypercholesterolemia 12/2/2010    RLS (restless legs syndrome)     Spine pain     Urge incontinence      Past Surgical History:   Procedure Laterality Date    HX OTHER SURGICAL  07/26/2016    pt states had \"lap band\" procedure    [place] LASER SURGERY OF EYE  4/2002    both     Current Outpatient Prescriptions   Medication Sig Dispense Refill    tadalafil (CIALIS) 20 mg tablet Take 1 Tab by mouth as needed. 90 day supply 18 Tab 3    gabapentin (NEURONTIN) 300 mg capsule 1 Cap daily.  Lisdexamfetamine (VYVANSE) 70 mg capsule Take 70 mg by mouth every morning. Indications: ATTENTION-DEFICIT HYPERACTIVITY DISORDER      ascorbic acid, vitamin C, (VITAMIN C) 500 mg tablet Take 2,000 mg by mouth daily.  LACTOBACILLUS ACIDOPHILUS (PROBIOTIC PO) Take  by mouth daily.  cyanocobalamin 1,000 mcg tablet Take 5,000 mcg by mouth daily.  b complex vitamins tablet Take 1 Tab by mouth daily.  cholecalciferol, VITAMIN D3, (VITAMIN D3) 5,000 unit tab tablet Take 5,000 Units by mouth daily.  traZODone (DESYREL) 300 mg tablet Take 150 mg by mouth nightly.  lurasidone (LATUDA) 60 mg tab tablet Take 30 mg by mouth nightly. Indications: DEPRESSION ASSOCIATED WITH BIPOLAR DISORDER      multivitamin (ONE A DAY) tablet Take 1 Tab by mouth daily.  dextroamphetamine-amphetamine (ADDERALL) 30 mg tablet Take 30 mg by mouth every evening.       clonazePAM (KLONOPIN) 2 mg tablet Take 1 Tab by mouth three (3) times daily. (Patient taking differently: Take 2 mg by mouth two (2) times a day.) 3 Tab 0        Review of Symptoms:       General - No history or complaints of unexpected fever or chills  Head/Neck - No history or complaints of headache or dizziness  Cardiac - No history or complaints of chest pain, palpitations, or shortness of breath  Pulmonary - No history or complaints of shortness of breath or productive cough  Gastrointestinal - as noted above  Genitourinary - No history or complaints of hematuria/dysuria or renal lithiasis  Musculoskeletal - No history or complaints of joint  muscular weakness  Hematologic - No history of any bleeding episodes  Neurologic - No history or complaints of  migraine headaches or neurologic symptoms                     Objective:     Visit Vitals    /86 (BP 1 Location: Left arm, BP Patient Position: Sitting)    Pulse 83    Resp 16    Ht 6' 2\" (1.88 m)    Wt 94.8 kg (209 lb)    SpO2 100%    BMI 26.83 kg/m2        Physical Exam:    General:  alert, cooperative, no distress, appears stated age   Lungs:   clear to auscultation bilaterally   Heart:  Regular rate and rhythm   Abdomen:   abdomen is soft without significant tenderness, masses, organomegaly or guarding;     Incisions: No hernias       Lab Results   Component Value Date/Time    WBC 4.5 06/27/2017 11:55 AM    HGB 14.6 06/27/2017 11:55 AM    HCT 43.9 06/27/2017 11:55 AM    PLATELET 801 32/88/7102 11:55 AM    MCV 78 06/27/2017 11:55 AM     Lab Results   Component Value Date/Time    Sodium 139 06/27/2017 11:55 AM    Potassium 4.3 06/27/2017 11:55 AM    Chloride 98 06/27/2017 11:55 AM    CO2 25 06/27/2017 11:55 AM    Anion gap 9 07/18/2016 01:09 PM    Glucose 88 06/27/2017 11:55 AM    BUN 25 06/27/2017 11:55 AM    Creatinine 1.15 06/27/2017 11:55 AM    BUN/Creatinine ratio 22 06/27/2017 11:55 AM    GFR est AA >60 07/18/2016 01:09 PM    GFR est non-AA >60 07/18/2016 01:09 PM    Calcium 9.6 06/27/2017 11:55 AM    Bilirubin, total 0.3 06/27/2017 11:55 AM    AST (SGOT) 15 06/27/2017 11:55 AM    Alk. phosphatase 85 06/27/2017 11:55 AM    Protein, total 7.2 06/27/2017 11:55 AM    Albumin 4.9 06/27/2017 11:55 AM    Globulin 3.3 07/18/2016 01:09 PM    A-G Ratio 2.1 06/27/2017 11:55 AM    ALT (SGPT) 20 06/27/2017 11:55 AM     No results found for: IRON, FE, TIBC, IBCT, PSAT, FERR  No results found for: FOL, RBCF  No results found for: VITD3, Cecy Islas, XQVID, VD3RIA        Assessment:     1. History of Morbid obesity, status post  laparoscopic adjustable gastric band surgery. Will remove fluid from band today. Plan:     1. Remember to measure portions, continue low carbohydrate diet  2. Continue to concentrate on protein intake meeting daily requirements  3. Remember vitamin supplements. The importance of such was discussed regarding the malabsorptive issues that the surgery creates. 4. Exercise regimen appears to be: very good  5. Try and attend support group if feasible. 6. Follow-up prn.  7. Will remove fluid from band today  8. Total time spent with the patient 30 minutes.         [facility]  OFFICE PROCEDURE PROGRESS NOTE        Chart reviewed for the following:   Alexa Garrido MD, have reviewed the History, Physical and updated the Allergic reactions for [employer] performed immediately prior to start of procedure:   Alexa Garrido MD, have performed the following reviews on [address] prior to the start of the procedure:            * Patient was identified by name and date of birth   * Agreement on procedure being performed was verified  * Risks and Benefits explained to the patient  * Procedure site verified and marked as necessary  * Patient was positioned for comfort  * Consent was signed and verified     Time: 11:00am      Date of procedure: 7/17/2017    Procedure performed by: Ambar Santiago MD    Provider assisted by: none    Patient assisted by: self    How tolerated by patient: tolerated the procedure well with no complications    Post Procedural Pain Scale: 0 - No Hurt    Comments: none          Subjective:   Madison Diez is a 38 y.o. male with previous lap band surgery, who is here today needing lap band adjustment because of nausea, vomiting, unable to tolerate liquids and unable to tolerate solids. Dietary compliance is poor. Objective:     Visit Vitals    /86 (BP 1 Location: Left arm, BP Patient Position: Sitting)    Pulse 83    Resp 16    Ht 6' 2\" (1.88 m)    Wt 94.8 kg (209 lb)    SpO2 100%    BMI 26.83 kg/m2      Estimated body mass index is 26.83 kg/(m^2) as calculated from the following:    Height as of this encounter: 6' 2\" (1.88 m). Weight as of this encounter: 94.8 kg (209 lb). Wt Readings from Last 3 Encounters:   07/17/17 94.8 kg (209 lb)   07/12/17 94.9 kg (209 lb 4.8 oz)   07/10/17 96.3 kg (212 lb 6.4 oz)     His change in weight since last visit: lost,  2 lbs. The surgical area looks well healed and the port is properly located. Band Position: n/a. Assessment/Plan: Morbid Obesity, status post lap-band surgery, needing fill adjustment    Lap Band FiIl Procedure    The port area was cleaned with alcohol and a 20 gauge needle was inserted. Previous Fill Volume:  10.3 cc. Removed:  .7 cc   Total amount now in Band 9.6 ml    Patient tolerated the procedure well. Follow up in 4 to 6 weeks as needed.

## 2017-07-17 NOTE — MR AVS SNAPSHOT
Visit Information Date & Time Provider Department Dept. Phone Encounter #  
 7/17/2017 10:30 AM Carlos Eduardo Land MD [facility] [place] [phone] [id number] Your Appointments 7/20/2017 11:00 AM  
New Patient with Rc Frye MD  
[address] [facility]) Appt Note: achilles pain in(L) foot ref by Dr. Luis Serrato [phone]) $0CP yd 07/11/17  
 [address] [facility]  
[phone]  
  
   
 [address] [place] [id number]  
  
    
 9/12/2017  8:30 AM  
New Patient with MD [facility]) Appt Note: NP Thyroid, referred by Dr. Antonio Baez. [place] [phone]  
 [address] [id number] [address] [place] [address] Upcoming Health Maintenance Date Due INFLUENZA AGE 9 TO ADULT 8/1/2017 MEDICARE YEARLY EXAM 6/28/2018 DTaP/Tdap/Td series (2 - Td) 6/27/2027 Allergies as of 7/17/2017  Review Complete On: 7/17/2017 By: Joya Chowdhury No Known Allergies Current Immunizations  Reviewed on 6/27/2017 Name Date Tdap 6/27/2017 Not reviewed this visit Vitals BP Pulse Height(growth percentile) Weight(growth percentile) SpO2 BMI  
 127/86 (BP 1 Location: Left arm, BP Patient Position: Sitting) 83 6' 2\" (1.88 m) 209 lb (94.8 kg) 100% 26.83 kg/m2 Smoking Status Never Smoker BMI and BSA Data Body Mass Index Body Surface Area  
 26.83 kg/m 2 2.22 m 2 Preferred Pharmacy Pharmacy Name Phone [address] [facility] [phone] Your Updated Medication List  
  
   
This list is accurate as of: 7/17/17 11:15 AM.  Always use your most recent med list.  
  
  
  
  
 ascorbic acid (vitamin C) 500 mg tablet Commonly known as:  VITAMIN C Take 2,000 mg by mouth daily. b complex vitamins tablet Take 1 Tab by mouth daily. cholecalciferol (VITAMIN D3) 5,000 unit Tab tablet Commonly known as:  VITAMIN D3 Take 5,000 Units by mouth daily. clonazePAM 2 mg tablet Commonly known as:  Rollene Backbone Take 1 Tab by mouth three (3) times daily. cyanocobalamin 1,000 mcg tablet Take 5,000 mcg by mouth daily. dextroamphetamine-amphetamine 30 mg tablet Commonly known as:  ADDERALL Take 30 mg by mouth every evening.  
  
 gabapentin 300 mg capsule Commonly known as:  NEURONTIN  
1 Cap daily. Lisdexamfetamine 70 mg capsule Commonly known as:  VYVANSE Take 70 mg by mouth every morning. Indications: ATTENTION-DEFICIT HYPERACTIVITY DISORDER  
  
 lurasidone 60 mg Tab tablet Commonly known as:  Jonda Debar Take 30 mg by mouth nightly. Indications: DEPRESSION ASSOCIATED WITH BIPOLAR DISORDER  
  
 multivitamin tablet Commonly known as:  ONE A DAY Take 1 Tab by mouth daily. PROBIOTIC PO Take  by mouth daily. tadalafil 20 mg tablet Commonly known as:  CIALIS Take 1 Tab by mouth as needed. 90 day supply  
  
 traZODone 300 mg tablet Commonly known as:  Lourdes Faes Take 150 mg by mouth nightly. Patient Instructions Body Mass Index: Care Instructions Your Care Instructions Body mass index (BMI) can help you see if your weight is raising your risk for health problems. It uses a formula to compare how much you weigh with how tall you are. · A BMI lower than 18.5 is considered underweight. · A BMI between 18.5 and 24.9 is considered healthy. · A BMI between 25 and 29.9 is considered overweight. A BMI of 30 or higher is considered obese. If your BMI is in the normal range, it means that you have a lower risk for weight-related health problems.  If your BMI is in the overweight or obese range, you may be at increased risk for weight-related health problems, such as high blood pressure, heart disease, stroke, arthritis or joint pain, and diabetes. If your BMI is in the underweight range, you may be at increased risk for health problems such as fatigue, lower protection (immunity) against illness, muscle loss, bone loss, hair loss, and hormone problems. BMI is just one measure of your risk for weight-related health problems. You may be at higher risk for health problems if you are not active, you eat an unhealthy diet, or you drink too much alcohol or use tobacco products. Follow-up care is a key part of your treatment and safety. Be sure to make and go to all appointments, and call your doctor if you are having problems. It's also a good idea to know your test results and keep a list of the medicines you take. How can you care for yourself at home? · Practice healthy eating habits. This includes eating plenty of fruits, vegetables, whole grains, lean protein, and low-fat dairy. · If your doctor recommends it, get more exercise. Walking is a good choice. Bit by bit, increase the amount you walk every day. Try for at least 30 minutes on most days of the week. · Do not smoke. Smoking can increase your risk for health problems. If you need help quitting, talk to your doctor about stop-smoking programs and medicines. These can increase your chances of quitting for good. · Limit alcohol to 2 drinks a day for men and 1 drink a day for women. Too much alcohol can cause health problems. If you have a BMI higher than 25 · Your doctor may do other tests to check your risk for weight-related health problems. This may include measuring the distance around your waist. A waist measurement of more than 40 inches in men or 35 inches in women can increase the risk of weight-related health problems. · Talk with your doctor about steps you can take to stay healthy or improve your health.  You may need to make lifestyle changes to lose weight and stay healthy, such as changing your diet and getting regular exercise. If you have a BMI lower than 18.5 · Your doctor may do other tests to check your risk for health problems. · Talk with your doctor about steps you can take to stay healthy or improve your health. You may need to make lifestyle changes to gain or maintain weight and stay healthy, such as getting more healthy foods in your diet and doing exercises to build muscle. Where can you learn more? Go to http://yuri-olamide.info/. Enter S176 in the search box to learn more about \"Body Mass Index: Care Instructions. \" Current as of: January 23, 2017 Content Version: 11.3 © 0139-9849 [employer]. Care instructions adapted under license by [employer] (which disclaims liability or warranty for this information). If you have questions about a medical condition or this instruction, always ask your healthcare professional. [address] any warranty or liability for your use of this information. Introducing [facility] [place] [facility]! Dear Kristal Candelaria: 
Thank you for requesting a TapFit account. Our records indicate that you already have an active TapFit account. You can access your account anytime at [email or web address] Did you know that you can access your hospital and ER discharge instructions at any time in TapFit? You can also review all of your test results from your hospital stay or ER visit. Additional Information If you have questions, please visit the Frequently Asked Questions section of the TapFit website at [email or web address] Remember, TapFit is NOT to be used for urgent needs. For medical emergencies, dial 911. Now available from your iPhone and Android! Please provide this summary of care documentation to your next provider. Your primary care clinician is listed as [address].  If you have any questions after today's visit, please call [phone].

## 2017-07-17 NOTE — PATIENT INSTRUCTIONS
Body Mass Index: Care Instructions  Your Care Instructions    Body mass index (BMI) can help you see if your weight is raising your risk for health problems. It uses a formula to compare how much you weigh with how tall you are. · A BMI lower than 18.5 is considered underweight. · A BMI between 18.5 and 24.9 is considered healthy. · A BMI between 25 and 29.9 is considered overweight. A BMI of 30 or higher is considered obese. If your BMI is in the normal range, it means that you have a lower risk for weight-related health problems. If your BMI is in the overweight or obese range, you may be at increased risk for weight-related health problems, such as high blood pressure, heart disease, stroke, arthritis or joint pain, and diabetes. If your BMI is in the underweight range, you may be at increased risk for health problems such as fatigue, lower protection (immunity) against illness, muscle loss, bone loss, hair loss, and hormone problems. BMI is just one measure of your risk for weight-related health problems. You may be at higher risk for health problems if you are not active, you eat an unhealthy diet, or you drink too much alcohol or use tobacco products. Follow-up care is a key part of your treatment and safety. Be sure to make and go to all appointments, and call your doctor if you are having problems. It's also a good idea to know your test results and keep a list of the medicines you take. How can you care for yourself at home? · Practice healthy eating habits. This includes eating plenty of fruits, vegetables, whole grains, lean protein, and low-fat dairy. · If your doctor recommends it, get more exercise. Walking is a good choice. Bit by bit, increase the amount you walk every day. Try for at least 30 minutes on most days of the week. · Do not smoke. Smoking can increase your risk for health problems. If you need help quitting, talk to your doctor about stop-smoking programs and medicines. These can increase your chances of quitting for good. · Limit alcohol to 2 drinks a day for men and 1 drink a day for women. Too much alcohol can cause health problems. If you have a BMI higher than 25  · Your doctor may do other tests to check your risk for weight-related health problems. This may include measuring the distance around your waist. A waist measurement of more than 40 inches in men or 35 inches in women can increase the risk of weight-related health problems. · Talk with your doctor about steps you can take to stay healthy or improve your health. You may need to make lifestyle changes to lose weight and stay healthy, such as changing your diet and getting regular exercise. If you have a BMI lower than 18.5  · Your doctor may do other tests to check your risk for health problems. · Talk with your doctor about steps you can take to stay healthy or improve your health. You may need to make lifestyle changes to gain or maintain weight and stay healthy, such as getting more healthy foods in your diet and doing exercises to build muscle. Where can you learn more? Go to http://yuri-olamide.info/. Enter S176 in the search box to learn more about \"Body Mass Index: Care Instructions. \"  Current as of: January 23, 2017  Content Version: 11.3  © 6036-6470 [employer]. Care instructions adapted under license by [employer] (which disclaims liability or warranty for this information). If you have questions about a medical condition or this instruction, always ask your healthcare professional. [place] [address] any warranty or liability for your use of this information. Patient Instructions      1. Continue to monitor carbohydrate and protein intake- remember to keep your           total  carbohydrates to 50 grams or less per day for best results.   2. Remember hydration goals - usually 48 to 64 ounces of liquids per day  3. Continue to work towards exercise goals - minimum 3 days per week of 45          minutes to  1 hour at a time. 4. Remember to take vitamins as directed        Supplement Resource Guide    Importance of Protein:   Maintains lean body mass, produces antibodies to fight off infections, heals wounds, minimizes hair loss, helps to give you energy, helps with satiety, and keeping you full between meals. Importance of Calcium:  Needed for healthy bones and teeth, normal blood clotting, and nervous system functioning, higher risk of osteoporosis and bone disease with non-compliance. Importance of Multivitamins: Many functions. Supply you with extra nutrients that you may be missing from food. May lead to iron deficiency anemia, weakness, fatigue, and many other symptoms with non-compliance. Importance of B Vitamins:  Important for red blood cell formation, metabolism, energy, and helps to maintain a healthy nervous system. Protein Supplement  Find one you like now. Use immediately after surgery. Look for:  35-50g protein each day from your protein supplement once you reach the progression diet. 0-3 g fat per serving  0-3 g sugar per serving    Protein drinks should be split in separate dosages. Recommend: Lifelong  1 year + Calcium Supplement:     Start taking within a month after surgery. Look for: Calcium Citrate Plus D (1500 mg per day)  Recommend: Citracal     .          Avoid chocolate chewable calcium. Can use chewable bariatric or [employer] brand or similar chewable. The body cannot absorb more than 500-600 mg @ a time. Take for Life Multi-vitamin Supplement:      1st Month After Surgery: Any complete chewable, such as: Du Quoins Complete chewables. Avoid [employer] Quoin sours or gummies. They lack iron and other important nutrients and also have added sugar.     Continue with chewable vitamin or change to adult complete multivitamin one month after surgery. Menstruating women can take a prenatal vitamin. Make sure has at least 18 mg iron and 074-843 mcg folic acid):    Vitamin B12, B Complex Vitamin, and Biotin  Start taking within a month after surgery. Vitamin B12:  1000 mcg of Vitamin B12 three times weekly    Must take sublingually (meaning you take it under your tongue) or in a liquid drop form for easy absorption. B Complex Vitamin: Take a pill or liquid drop form once daily. Biotin: This vitamin can help prevent hair loss.     Recommend 5mg   (5000 mcg) a day  Biotin is Optional

## 2017-07-20 ENCOUNTER — OFFICE VISIT (OUTPATIENT)
Dept: INTERNAL MEDICINE CLINIC | Age: 42
End: 2017-07-20

## 2017-07-20 VITALS
WEIGHT: 208 LBS | TEMPERATURE: 98 F | HEIGHT: 72 IN | HEART RATE: 68 BPM | RESPIRATION RATE: 14 BRPM | DIASTOLIC BLOOD PRESSURE: 90 MMHG | SYSTOLIC BLOOD PRESSURE: 130 MMHG | BODY MASS INDEX: 28.17 KG/M2 | OXYGEN SATURATION: 100 %

## 2017-07-20 DIAGNOSIS — M76.62 TENDONITIS, ACHILLES, LEFT: Primary | ICD-10-CM

## 2017-07-20 NOTE — PROGRESS NOTES
Chief Complaint   Patient presents with    Foot Pain     chronic ankle pain (left)     he is a 38y.o. year old male who presents for evaluation of   Pain Assessment Encounter      Jcmisael Ruth  7/20/2017  Onset of Symptoms: Since Nov of 2016  ________________________________________________________________________  Description: In October 2016 patient sprained his right ankle believes he may been compensating abnormally with his gait. States that his left heel now hurts, he does have a history of Achilles tendon tear 2 years ago in the left Achilles    Frequency: 1-2 times a day  Pain Scale:(1-10): 3  Trauma Hx: none  Hx of similar symptoms: No:   Radiation: ankle  Duration:  intermittent      Progression: has worsened slightly  What makes it better?: rest  What makes it worse?:exercise  Medications tried: ibuprofen    Reviewed and agree with Nurse Note and duplicated in this note. Reviewed PmHx, RxHx, FmHx, SocHx, AllgHx and updated and dated in the chart.     Family History   Problem Relation Age of Onset    Cancer Mother     Diabetes Father     Cancer Other     Diabetes Other        Past Medical History:   Diagnosis Date    Balance problems     Bipolar 2 disorder ([facility] [address]     Chronic fatigue syndrome     Chronic pain     back pain worse    Depression     ED (erectile dysfunction)     Extremity pain     Fever and chills     Fibromyalgia     GERD (gastroesophageal reflux disease)     GERD (gastroesophageal reflux disease)     Headache since about 21y/o    excedrine migraine daily for HA multiple doses (3-4 daily) Neurologist 2008, had an MRI brain 2007    HTN (hypertension) 11/3/2010    no medication as of 4/2016    Hypertonicity of bladder     IGT (impair glucose tolerance) 12/2/2010    Insomnia     Insomnia     Neck pain     Night sweats     Obesity, Class II, BMI 35-39.9, with comorbidity (HCC)     Osteoarthritis of back     Osteoarthritis of both knees     PTSD (post-traumatic stress disorder)      related    Pure hypercholesterolemia 12/2/2010    RLS (restless legs syndrome)     Spine pain     Urge incontinence       Social History     Social History    Marital status: SINGLE     Spouse name: N/A    Number of children: N/A    Years of education: N/A     Social History Main Topics    Smoking status: Never Smoker    Smokeless tobacco: Never Used    Alcohol use No      Comment: rarely    Drug use: No    Sexual activity: Yes     Partners: Female     Birth control/ protection: Pill     Other Topics Concern    Not on file     Social History Narrative        Review of Systems - negative except as listed above      Objective:     Vitals:    07/20/17 1046   BP: 130/90   Pulse: 68   Resp: 14   Temp: 98 °F (36.7 °C)   TempSrc: Oral   SpO2: 100%   Weight: 208 lb (94.3 kg)   Height: 6' (1.829 m)       Physical Examination: General appearance - alert, well appearing, and in no distress  Back exam - full range of motion, no tenderness, palpable spasm or pain on motion  Neurological - alert, oriented, normal speech, no focal findings or movement disorder noted  Musculoskeletal - A left ankle exam was performed.   Swelling: none  Warmth: no warmth  Tenderness: none    Palpation:  ATFL:  non-tender  CFL:  non-tender  PTFL:  non-tender  Syndesmosis Ligament:  non-tender  Deltoid ligament:  non-tender  Posterior Tibialis Tendon:  non-tender  Peroneal Tendon: non-tender  Achilles Tendon:  tender     Proximal Fibula:  non-tender  Proximal Tibia:  non-tender  Distal Fibula:  non-tender  Distal Tibia:  non-tender    Plantar Fascia: non-tender  Midfoot:  non-tender  Forefoot:  non-tender    ROM:  Plantar Flexion:  normal  Dorsiflexion:  normal    Squeeze Test: negative  Talar Tilt Test:  negative  Anterior Drawer:negative    Kleiger Test:  negative  Hop Test: negative  [place]: negative      Extremities - peripheral pulses normal, no pedal edema, no clubbing or cyanosis  Skin - normal coloration and turgor, no rashes, no suspicious skin lesions noted    Assessment/ Plan:   [place] was seen today for foot pain. Diagnoses and all orders for this visit:    Tendonitis, Achilles, left  -     XR CALCANEUS LT; Future  -     REFERRAL TO PHYSICAL THERAPY         Pathophysiology, recovery and rehabilitation process discussed and questions answered   Counseling for 30 Minutes of the total visit duration   Pictures and figures used as necessary   Provided reassurance   Handouts provided and reviewed with patient for achilles tendonitis  Monitor response to Physical Therapy   Recommend activity modification   Recommend  lower impact activities-walking, Eliptical, Nordic Track, cycling or swimming   Follow up in 4 week(s)  Xray's reviewed - within normal limits           I have discussed the diagnosis with the patient and the intended plan as seen in the above orders. The patient has received an after-visit summary and questions were answered concerning future plans. Medication Side Effects and Warnings were discussed with patient: yes  Patient Labs were reviewed and or requested: yes  Patient Past Records were reviewed and or requested  yesyes  I have discussed the diagnosis with the patient and the intended plan as seen in the above orders. The patient has received an after-visit summary and questions were answered concerning future plans. Pt agrees to call or return to clinic and/or go to closest ER with any worsening of symptoms. This may include, but not limited to increased fever (>100.4) with NSAIDS or Tylenol, increased edema, confusion, rash, worsening of presenting symptoms. 1) Remember to stay active and/or exercise regularly (I suggest 30-45 minutes daily)   2) For reliable dietary information, go to www. EATRIGHT.org. You may wish to consider seeing the nutritionist at [facility] at #[phone] or [phone], also consider the [address]   3) I routinely suggest a complete physical exam once each year (your birth month)

## 2017-07-20 NOTE — MR AVS SNAPSHOT
Visit Information Date & Time Provider Department Dept. Phone Encounter #  
 7/20/2017 11:00 AM Edith Alvarez MD [facility] and [place] [id number] Your Appointments 9/12/2017  8:30 AM  
New Patient with MD [facility]) Appt Note: NP Thyroid, referred by Dr. Asad Wellington. [place] [phone]  
 [address] [place] [address] [address] [phone] [address] [place] [address] Upcoming Health Maintenance Date Due INFLUENZA AGE 9 TO ADULT 8/1/2017 MEDICARE YEARLY EXAM 6/28/2018 DTaP/Tdap/Td series (2 - Td) 6/27/2027 Allergies as of 7/20/2017  Review Complete On: 7/20/2017 By: Edith Alvarez MD  
 No Known Allergies Current Immunizations  Reviewed on 6/27/2017 Name Date Tdap 6/27/2017 Not reviewed this visit You Were Diagnosed With   
  
 Codes Comments Tendonitis, Achilles, left    -  Primary ICD-10-CM: M76.62 
ICD-9-CM: 726.71 Vitals BP Pulse Temp Resp Height(growth percentile) Weight(growth percentile) 130/90 (BP 1 Location: Right arm, BP Patient Position: Sitting) 68 98 °F (36.7 °C) (Oral) 14 6' (1.829 m) 208 lb (94.3 kg) SpO2 BMI Smoking Status 100% 28.21 kg/m2 Never Smoker BMI and BSA Data Body Mass Index Body Surface Area  
 28.21 kg/m 2 2.19 m 2 Preferred Pharmacy Pharmacy Name Phone [facility] [phone] Your Updated Medication List  
  
   
This list is accurate as of: 7/20/17 11:21 AM.  Always use your most recent med list.  
  
  
  
  
 ascorbic acid (vitamin C) 500 mg tablet Commonly known as:  VITAMIN C Take 2,000 mg by mouth daily. b complex vitamins tablet Take 1 Tab by mouth daily. cholecalciferol (VITAMIN D3) 5,000 unit Tab tablet Commonly known as:  VITAMIN D3 Take 5,000 Units by mouth daily. clonazePAM 2 mg tablet Commonly known as:  Waynard Herndon Take 1 Tab by mouth three (3) times daily. cyanocobalamin 1,000 mcg tablet Take 5,000 mcg by mouth daily. dextroamphetamine-amphetamine 30 mg tablet Commonly known as:  ADDERALL Take 30 mg by mouth every evening.  
  
 gabapentin 300 mg capsule Commonly known as:  NEURONTIN  
1 Cap daily. Lisdexamfetamine 70 mg capsule Commonly known as:  VYVANSE Take 70 mg by mouth every morning. Indications: ATTENTION-DEFICIT HYPERACTIVITY DISORDER  
  
 lurasidone 60 mg Tab tablet Commonly known as:  Caleen Mantle Take 30 mg by mouth nightly. Indications: DEPRESSION ASSOCIATED WITH BIPOLAR DISORDER  
  
 multivitamin tablet Commonly known as:  ONE A DAY Take 1 Tab by mouth daily. PROBIOTIC PO Take  by mouth daily. tadalafil 20 mg tablet Commonly known as:  CIALIS Take 1 Tab by mouth as needed. 90 day supply  
  
 traZODone 300 mg tablet Commonly known as:  Quiñones Elks Take 150 mg by mouth nightly. We Performed the Following REFERRAL TO PHYSICAL THERAPY [YEV79 Custom] Comments:  
 Please evaluate patient for left achilles tendonitis. To-Do List   
 07/20/2017 Imaging:  XR CALCANEUS LT Referral Information Referral ID Referred By Referred To  
  
 [id number] Emma LEHMAN Not Available Visits Status Start Date End Date 1 New Request 7/20/17 7/20/18 If your referral has a status of pending review or denied, additional information will be sent to support the outcome of this decision. Introducing [facility] & HEALTH SERVICES! Dear Nay Serra: 
Thank you for requesting a [employer] account. Our records indicate that you already have an active [employer] account. You can access your account anytime at [email or web address]. [email or web address] Did you know that you can access your hospital and ER discharge instructions at any time in [employer]? You can also review all of your test results from your hospital stay or ER visit. Additional Information If you have questions, please visit the Frequently Asked Questions section of the [employer] website at [email or web address] Remember, [employer] is NOT to be used for urgent needs. For medical emergencies, dial 911. Now available from your iPhone and Android! Please provide this summary of care documentation to your next provider. Your primary care clinician is listed as [facility]. If you have any questions after today's visit, please call [phone].

## 2017-08-17 ENCOUNTER — NURSE NAVIGATOR (OUTPATIENT)
Dept: SURGERY | Age: 42
End: 2017-08-17

## 2017-08-24 ENCOUNTER — OFFICE VISIT (OUTPATIENT)
Dept: INTERNAL MEDICINE CLINIC | Age: 42
End: 2017-08-24

## 2017-08-24 VITALS
DIASTOLIC BLOOD PRESSURE: 89 MMHG | OXYGEN SATURATION: 100 % | BODY MASS INDEX: 28.17 KG/M2 | HEART RATE: 82 BPM | HEIGHT: 72 IN | RESPIRATION RATE: 16 BRPM | WEIGHT: 208 LBS | SYSTOLIC BLOOD PRESSURE: 145 MMHG | TEMPERATURE: 98 F

## 2017-08-24 DIAGNOSIS — M76.62 TENDONITIS, ACHILLES, LEFT: Primary | ICD-10-CM

## 2017-08-24 NOTE — MR AVS SNAPSHOT
Visit Information Date & Time Provider Department Dept. Phone Encounter #  
 8/24/2017  9:30 AM Navneet Whitfield MD [facility] and [place] [address] [id number] Follow-up Instructions Return in about 4 weeks (around 9/21/2017) for achilles tendonitis. Follow-up and Disposition History Your Appointments 9/12/2017  8:30 AM  
New Patient with MD [facility] App[facility] Note: NP Thyroid, referred by Dr. Kwasi Maya. [place] [phone]  
 [address] [place] [address] [address] [phone] [address] [place] [address] Upcoming Health Maintenance Date Due INFLUENZA AGE 9 TO ADULT 8/1/2017 MEDICARE YEARLY EXAM 6/28/2018 DTaP/Tdap/Td series (2 - Td) 6/27/2027 Allergies as of 8/24/2017  Review Complete On: 8/24/2017 By: Navneet Whitfield MD  
 No Known Allergies Current Immunizations  Reviewed on 6/27/2017 Name Date Tdap 6/27/2017 Not reviewed this visit You Were Diagnosed With   
  
 Codes Comments Tendonitis, Achilles, left    -  Primary ICD-10-CM: M76.62 
ICD-9-CM: 726.71 Vitals BP Pulse Temp Resp Height(growth percentile) Weight(growth percentile) 145/89 (BP 1 Location: Right arm, BP Patient Position: Sitting) 82 98 °F (36.7 °C) (Oral) 16 6' (1.829 m) 208 lb (94.3 kg) SpO2 BMI Smoking Status 100% 28.21 kg/m2 Never Smoker BMI and BSA Data Body Mass Index Body Surface Area  
 28.21 kg/m 2 2.19 m 2 Preferred Pharmacy Pharmacy Name Phone [address] [facility] [phone] Your Updated Medication List  
  
   
This list is accurate as of: 8/24/17  9:52 AM.  Always use your most recent med list.  
  
  
  
  
 ascorbic acid (vitamin C) 500 mg tablet Commonly known as:  VITAMIN C  
 Take 2,000 mg by mouth daily. b complex vitamins tablet Take 1 Tab by mouth daily. cholecalciferol (VITAMIN D3) 5,000 unit Tab tablet Commonly known as:  VITAMIN D3 Take 5,000 Units by mouth daily. clonazePAM 2 mg tablet Commonly known as:  Iraida Lone Take 1 Tab by mouth three (3) times daily. cyanocobalamin 1,000 mcg tablet Take 5,000 mcg by mouth daily. dextroamphetamine-amphetamine 30 mg tablet Commonly known as:  ADDERALL Take 30 mg by mouth every evening.  
  
 gabapentin 300 mg capsule Commonly known as:  NEURONTIN  
1 Cap daily. Lisdexamfetamine 70 mg capsule Commonly known as:  VYVANSE Take 70 mg by mouth every morning. Indications: ATTENTION-DEFICIT HYPERACTIVITY DISORDER  
  
 lurasidone 60 mg Tab tablet Commonly known as:  [place] Take 30 mg by mouth nightly. Indications: DEPRESSION ASSOCIATED WITH BIPOLAR DISORDER  
  
 multivitamin tablet Commonly known as:  ONE A DAY Take 1 Tab by mouth daily. PROBIOTIC PO Take  by mouth daily. tadalafil 20 mg tablet Commonly known as:  CIALIS Take 1 Tab by mouth as needed. 90 day supply  
  
 traZODone 300 mg tablet Commonly known as:  Wale Tse Take 150 mg by mouth nightly. Follow-up Instructions Return in about 4 weeks (around 9/21/2017) for achilles tendonitis. Patient Instructions Consider injection called PROLOTHERAPY if no resolution Patient Instructions History Introducing hospitals & HEALTH SERVICES! Dear James Mcconnell: 
Thank you for requesting a [employer] account. Our records indicate that you already have an active [employer] account. You can access your account anytime at [email or web address]. [employer] Did you know that you can access your hospital and ER discharge instructions at any time in [employer]? You can also review all of your test results from your hospital stay or ER visit. Additional Information If you have questions, please visit the Frequently Asked Questions section of the [employer] website at [email or web address] Remember, [email or web address] is NOT to be used for urgent needs. For medical emergencies, dial 911. Now available from your iPhone and Android! Please provide this summary of care documentation to your next provider. Your primary care clinician is listed as [facility]. If you have any questions after today's visit, please call [phone].

## 2017-08-24 NOTE — PROGRESS NOTES
Chief Complaint   Patient presents with    Ankle Injury     L tendon pain     he is a 38y.o. year old male who presents for follow up of injury. Follow Up Pain Assessment Encounter      Onset of Symptoms: Oct 2016  ________________________________________________________________________  Description: Pain is now 75% has moderately improved      Pain Scale:(1-10): 1  Duration:  Brief after workouts  What makes it better?: rest and strecthing  What makes it worse?:exercise  Medications tried: ibuprofen  Modalities tried: PT        Reviewed and agree with Nurse Note and duplicated in this note. Reviewed PmHx, RxHx, FmHx, SocHx, AllgHx and updated and dated in the chart.     Family History   Problem Relation Age of Onset    Cancer Mother     Diabetes Father     Cancer Other     Diabetes Other        Past Medical History:   Diagnosis Date    Balance problems     Bipolar 2 disorder ([facility] [address]     Chronic fatigue syndrome     Chronic pain     back pain worse    Depression     ED (erectile dysfunction)     Extremity pain     Fibromyalgia     GERD (gastroesophageal reflux disease)     Headache since about 21y/o    excedrine migraine daily for HA multiple doses (3-4 daily) Neurologist 2008, had an MRI brain 2007    HTN (hypertension) 11/3/2010    no medication as of 4/2016    Hypertonicity of bladder     IGT (impair glucose tolerance) 12/2/2010    Insomnia     Neck pain     Obesity, Class II, BMI 35-39.9, with comorbidity     Osteoarthritis of back     Osteoarthritis of both knees     Pituitary microadenoma (HCC)     PTSD (post-traumatic stress disorder)      related    Pure hypercholesterolemia 12/2/2010    RLS (restless legs syndrome)     Spine pain     Urge incontinence       Social History     Social History    Marital status: SINGLE     Spouse name: N/A    Number of children: N/A    Years of education: N/A     Social History Main Topics    Smoking status: Never Smoker    Smokeless tobacco: Never Used    Alcohol use No      Comment: rarely    Drug use: No    Sexual activity: Yes     Partners: Female     Birth control/ protection: Pill     Other Topics Concern    Not on file     Social History Narrative        Review of Systems - negative except as listed above      Objective:     Vitals:    08/24/17 0938   BP: 145/89   Pulse: 82   Resp: 16   Temp: 98 °F (36.7 °C)   TempSrc: Oral   SpO2: 100%   Weight: 208 lb (94.3 kg)   Height: 6' (1.829 m)       Physical Examination: General appearance - alert, well appearing, and in no distress  Back exam - full range of motion, no tenderness, palpable spasm or pain on motion  Neurological - alert, oriented, normal speech, no focal findings or movement disorder noted  Musculoskeletal - A left ankle exam was performed. Swelling: none  Warmth: no warmth  Tenderness: none    Palpation:  ATFL:  non-tender  CFL:  non-tender  PTFL:  non-tender  Syndesmosis Ligament:  non-tender  Deltoid ligament:  non-tender  Posterior Tibialis Tendon:  non-tender  Peroneal Tendon: non-tender  Achilles Tendon:  tender     Proximal Fibula:  non-tender  Proximal Tibia:  non-tender  Distal Fibula:  non-tender  Distal Tibia:  non-tender    Plantar Fascia: non-tender  Midfoot:  non-tender  Forefoot:  non-tender    ROM:  Plantar Flexion:  normal  Dorsiflexion:  normal    Squeeze Test: negative  Talar Tilt Test:  negative  Anterior Drawer:negative    Kleiger Test:  negative  Hop Test: negative  Mulga: negative      Extremities - peripheral pulses normal, no pedal edema, no clubbing or cyanosis  Skin - normal coloration and turgor, no rashes, no suspicious skin lesions noted      Assessment/ Plan:   Diagnoses and all orders for this visit:    1. Tendonitis, Achilles, left    Consider prolotherapy injections his pain does not go away with home therapy  Follow-up Disposition:  Return in about 4 weeks (around 9/21/2017) for achilles tendonitis.     Pathophysiology, recovery and rehabilitation process discussed and questions answered   Counseling for 30 Minutes of the total visit duration   Pictures and figures used as necessary   Provided reassurance   Handouts provided and reviewed with patient for achilles tendonitis  Monitor response to Physical Therapy   Recommend activity modification   Recommend  lower impact activities-walking, Eliptical, Nordic Track, cycling or swimming   Follow up in 4 week(s)      I have discussed the diagnosis with the patient and the intended plan as seen in the above orders. The patient has received an after-visit summary and questions were answered concerning future plans. Medication Side Effects and Warnings were discussed with patient: yes  Patient Labs were reviewed and or requested: yes  Patient Past Records were reviewed and or requested  yes  I have discussed the diagnosis with the patient and the intended plan as seen in the above orders. The patient has received an after-visit summary and questions were answered concerning future plans. Pt agrees to call or return to clinic and/or go to closest ER with any worsening of symptoms. This may include, but not limited to increased fever (>100.4) with NSAIDS or Tylenol, increased edema, confusion, rash, worsening of presenting symptoms. Patient was informed/counseled to:    1) Remember to stay active and/or exercise regularly (I suggest 30-45 minutes daily)   2) For reliable dietary information, go to www. EATRIGHT.org. You may wish to consider seeing the nutritionist at [facility] at #[phone] or [phone], also consider the [address]   3) I routinely suggest a complete physical exam once each year (your birth month)

## 2017-09-12 ENCOUNTER — OFFICE VISIT (OUTPATIENT)
Dept: ENDOCRINOLOGY | Age: 42
End: 2017-09-12

## 2017-09-12 VITALS
HEART RATE: 76 BPM | SYSTOLIC BLOOD PRESSURE: 139 MMHG | BODY MASS INDEX: 28.76 KG/M2 | DIASTOLIC BLOOD PRESSURE: 89 MMHG | WEIGHT: 212.3 LBS | HEIGHT: 72 IN

## 2017-09-12 DIAGNOSIS — D35.2 PITUITARY MICROADENOMA (HCC): Primary | ICD-10-CM

## 2017-09-12 RX ORDER — KETOCONAZOLE 20 MG/G
CREAM TOPICAL
Refills: 5 | COMMUNITY
Start: 2017-09-07 | End: 2018-03-12

## 2017-09-12 RX ORDER — FLUCONAZOLE 150 MG/1
TABLET ORAL
Refills: 0 | COMMUNITY
Start: 2017-09-07 | End: 2018-03-03

## 2017-09-12 RX ORDER — KETOCONAZOLE 20 MG/ML
SHAMPOO TOPICAL
Refills: 11 | COMMUNITY
Start: 2017-09-07 | End: 2018-03-12

## 2017-09-12 NOTE — PROGRESS NOTES
CONSULTATION REQUESTED BY: Barb Serrano MD     REASON FOR CONSULT: Pituitary Adenoma    CHIEF COMPLAINT: Pituitary Microadenoma    HISTORY OF PRESENT ILLNESS:   Lollie Goldmann is a 38 y.o. male with a PMHx as noted below who was referred to our endocrinology clinic for evaluation of a pituitary microadenomaadenoma. The patient notes that he was not aware of any pituitary issues until June of this year 2017 when his doctor reviewed old records, identified an old MRI from 2011 suggestive of a pituitary microadenoma and obtained a new one. An MRI was obtained on 6/29/17. The radiologist reports: \"Pituitary: Normal sized pituitary gland. Slight fullness of the left side of the  gland associated with a subtle hypoenhancing focus best seen on the coronal  images, approximately 6 mm transverse, 5 mm craniocaudad and 7 mm AP. .... Addendum: Images from the prior study dated 8/16/2011 are now available for comparison. The margins of the hypoenhancing lesion in the left side of the pituitary gland  are less clearly defined currently as compared with the previous study. Size has  probably not changed significantly. There has been no interval enlargement. Coronal images suggest that the hypoenhancing area may be slightly smaller. \"    With respect to headaches or visual fields, the patient reports sporadic headaches, hx of migraines. Reports sensitivity to lights. Vision is occasionally blurry. Had laser eye surgery and recent eye exam noting a normal vision exam result. The patient admits to erectile dysfunction, uses cialis occasionally. Reports taking DHEA supplement. They admit to fatigue and generalized weakness. They report any growth of breast tissue or secretion of milk from their breast.   Had weight loss surgery last year noting he lost 90 pounds, reports he had larger breasts at the time, improved after surgery. They note that shoe size and ring size is smaller after surgery, not larger. They deny diagnosis of HTN and diabetes. Treated for bipolar-depression and anxiety,    Family history is not certain for pituitary disorders. Patient would like to know what the next steps would be. Component      Latest Ref Rng & Units 6/27/2017 6/27/2017 6/27/2017 6/27/2017          11:55 AM 11:55 AM 11:55 AM 11:55 AM   Glucose      65 - 99 mg/dL       BUN      6 - 24 mg/dL       Creatinine      0.76 - 1.27 mg/dL       BUN/Creatinine ratio      9 - 20       Sodium      134 - 144 mmol/L       Potassium      3.5 - 5.2 mmol/L       Chloride      96 - 106 mmol/L       CO2      18 - 29 mmol/L       Calcium      8.7 - 10.2 mg/dL       Protein, total      6.0 - 8.5 g/dL       Albumin      3.5 - 5.5 g/dL       GLOBULIN, TOTAL      1.5 - 4.5 g/dL       A-G Ratio      1.2 - 2.2       Bilirubin, total      0.0 - 1.2 mg/dL       Alk.  phosphatase      39 - 117 IU/L       AST      0 - 40 IU/L       ALT (SGPT)      0 - 44 IU/L       Testosterone      348 - 1197 ng/dL       Comment             Free testosterone (Direct)      6.8 - 21.5 pg/mL       Hemoglobin A1c, (calculated)      4.8 - 5.6 %       Estimated average glucose      mg/dL       Luteinizing hormone      1.7 - 8.6 mIU/mL   7.8    FSH      1.5 - 12.4 mIU/mL   9.9    Prolactin      4.0 - 15.2 ng/mL    10.4   T4, Free      0.82 - 1.77 ng/dL  1.45     TSH      0.450 - 4.500 uIU/mL 2.360        Component      Latest Ref Rng & Units 6/27/2017 6/27/2017 6/27/2017          11:55 AM 11:55 AM 11:55 AM   Glucose      65 - 99 mg/dL   88   BUN      6 - 24 mg/dL   25 (H)   Creatinine      0.76 - 1.27 mg/dL   1.15   BUN/Creatinine ratio      9 - 20   22 (H)   Sodium      134 - 144 mmol/L   139   Potassium      3.5 - 5.2 mmol/L   4.3   Chloride      96 - 106 mmol/L   98   CO2      18 - 29 mmol/L   25   Calcium      8.7 - 10.2 mg/dL   9.6   Protein, total      6.0 - 8.5 g/dL   7.2   Albumin      3.5 - 5.5 g/dL   4.9   GLOBULIN, TOTAL      1.5 - 4.5 g/dL   2.3   A-G Ratio 1.2 - 2.2   2.1   Bilirubin, total      0.0 - 1.2 mg/dL   0.3   Alk. phosphatase      39 - 117 IU/L   85   AST      0 - 40 IU/L   15   ALT (SGPT)      0 - 44 IU/L   20   Testosterone      348 - 1197 ng/dL 530     Comment       Comment     Free testosterone (Direct)      6.8 - 21.5 pg/mL 14.2     Hemoglobin A1c, (calculated)      4.8 - 5.6 %  5.3    Estimated average glucose      mg/dL  105    Luteinizing hormone      1.7 - 8.6 mIU/mL      FSH      1.5 - 12.4 mIU/mL      Prolactin      4.0 - 15.2 ng/mL      T4, Free      0.82 - 1.77 ng/dL      TSH      0.450 - 4.500 uIU/mL          PAST MEDICAL/SURGICAL HISTORY:   Past Medical History:   Diagnosis Date    Balance problems     Bipolar 2 disorder (HCC)     Chronic fatigue syndrome     Chronic pain     back pain worse    Depression     ED (erectile dysfunction)     Extremity pain     Fibromyalgia     GERD (gastroesophageal reflux disease)     Headache since about 21y/o    excedrine migraine daily for HA multiple doses (3-4 daily) Neurologist 2008, had an MRI brain 2007    HTN (hypertension) 11/3/2010    no medication as of 4/2016    Hypertonicity of bladder     IGT (impair glucose tolerance) 12/2/2010    Insomnia     Neck pain     Obesity, Class II, BMI 35-39.9, with comorbidity     Osteoarthritis of back     Osteoarthritis of both knees     Pituitary microadenoma (HCC)     PTSD (post-traumatic stress disorder)      related    Pure hypercholesterolemia 12/2/2010    RLS (restless legs syndrome)     Spine pain     Urge incontinence      Past Surgical History:   Procedure Laterality Date    HX OTHER SURGICAL  07/26/2016    pt states had \"lap band\" procedure    TN LASER SURGERY OF EYE  4/2002    both       ALLERGIES:   Not on File    MEDICATIONS ON ADMISSION:     Current Outpatient Prescriptions:     prasterone, dhea, (DHEA) 50 mg tab, Take  by mouth., Disp: , Rfl:     gabapentin (NEURONTIN) 300 mg capsule, 1 Cap daily. , Disp: , Rfl:   Lisdexamfetamine (VYVANSE) 70 mg capsule, Take 70 mg by mouth every morning. Indications: ATTENTION-DEFICIT HYPERACTIVITY DISORDER, Disp: , Rfl:     ascorbic acid, vitamin C, (VITAMIN C) 500 mg tablet, Take 2,000 mg by mouth daily. , Disp: , Rfl:     LACTOBACILLUS ACIDOPHILUS (PROBIOTIC PO), Take  by mouth daily. , Disp: , Rfl:     cyanocobalamin 1,000 mcg tablet, Take 5,000 mcg by mouth daily. , Disp: , Rfl:     b complex vitamins tablet, Take 1 Tab by mouth daily. , Disp: , Rfl:     cholecalciferol, VITAMIN D3, (VITAMIN D3) 5,000 unit tab tablet, Take 5,000 Units by mouth daily. , Disp: , Rfl:     traZODone (DESYREL) 300 mg tablet, Take 150 mg by mouth nightly., Disp: , Rfl:     lurasidone (LATUDA) 60 mg tab tablet, Take 30 mg by mouth nightly. Indications: DEPRESSION ASSOCIATED WITH BIPOLAR DISORDER, Disp: , Rfl:     multivitamin (ONE A DAY) tablet, Take 1 Tab by mouth daily. , Disp: , Rfl:     dextroamphetamine-amphetamine (ADDERALL) 30 mg tablet, Take 30 mg by mouth every evening., Disp: , Rfl:     clonazePAM (KLONOPIN) 2 mg tablet, Take 1 Tab by mouth three (3) times daily. (Patient taking differently: Take 2 mg by mouth two (2) times a day.), Disp: 3 Tab, Rfl: 0    fluconazole (DIFLUCAN) 150 mg tablet, TK 2 TS PO TODAY THEN REPEAT IN 1 WEEK, Disp: , Rfl: 0    ketoconazole (NIZORAL) 2 % topical cream, , Disp: , Rfl: 5    ketoconazole (NIZORAL) 2 % shampoo, LATHER ON AREA WHERE YOU TYPICALLY GET RASH. LEAVE ON 5 MINUTES THEN WASH OFF. U EVEN WHEN SKIN IS CLEAR FOR PREVENTION, Disp: , Rfl: 11    tadalafil (CIALIS) 20 mg tablet, Take 1 Tab by mouth as needed. 90 day supply, Disp: 18 Tab, Rfl: 3    SOCIAL HISTORY:   Social History     Social History    Marital status: SINGLE     Spouse name: N/A    Number of children: N/A    Years of education: N/A     Occupational History    Not on file.      Social History Main Topics    Smoking status: Never Smoker    Smokeless tobacco: Never Used    Alcohol use No      Comment: rarely    Drug use: No    Sexual activity: Yes     Partners: Female     Birth control/ protection: Pill     Other Topics Concern    Not on file     Social History Narrative       FAMILY HISTORY:  Family History   Problem Relation Age of Onset    Cancer Mother     Diabetes Father     Cancer Other     Diabetes Other        REVIEW OF SYSTEMS: Complete ROS assessed and noted for that which is described above, all else are negative. Eyes: normal  ENT: normal  CVS: normal  Resp: normal  GI: normal  : normal  GYN: normal  Endocrine: normal  Integument: normal  Musculoskeletal: normal  Neuro: normal  Psych: normal      PHYSICAL EXAMINATION:    VITAL SIGNS:  Visit Vitals    /89 (BP 1 Location: Left arm, BP Patient Position: Sitting)    Pulse 76    Ht 6' (1.829 m)    Wt 212 lb 4.8 oz (96.3 kg)    BMI 28.79 kg/m2       GENERAL: NCAT, Sitting comfortably, NAD  EYES: EOMI, non-icteric, visual fields intact on basic examination  Ear/Nose/Throat: NCAT, no inflammation, no masses  LYMPH NODES: No LAD  CARDIOVASCULAR: S1 S2, RRR, No murmur, 2+ radial pulses  RESPIRATORY: CTA b/l, no wheeze/rales  GASTROINTESTINAL:  NT, ND,  MUSCULOSKELETAL: Normal ROM, no atrophy  SKIN: warm, no edema/rash/ or other skin changes  NEUROLOGIC: 5/5 power all extremities, no tremors, AAOx3  PSYCHIATRIC: Normal affect, Normal insight and judgement         REVIEW OF LABORATORY AND RADIOLOGY DATA:   Labs and documentation have been reviewed as described above. ASSESSMENT AND PLAN:   Tran Cuellar is a 38 y.o. male with a PMHx as noted above who was referred to our endocrinology clinic for evaluation of a pituitary adenoma. Pituitary Microadenoma    Today we have spent time discussing the pituitary axis and how it can affect various endocrine gland function.  Using the white board I was able to illustrate the concerns of pituitary adenoma with respect to mass effect and also due to hormone-hormone interactions. * Patients MRI has been stable over the past 6-7 years which is reassuring. The pituitary itself is not enlarged due to the lesion, and is not responsible for any vision disturbances or headaches, and in fact it is reassuring that it may have potentially become slightly smaller compared with prior MRI 2011 per report. Reviewing all of the recent pituitary labs suggest there is no evidence of pituitary hormone deficiency or oversecretion which is also reassuring. Plan as below:     Laboratory Evaluation:  Thyroid: normal, no role for thyroid hormone replacement  Adrenal: We will check an AM cortisol level to assess for adequate adrenal function. This may require a f/u evaluation with an ACTH stimulation test if suggestively low. Gonadal Function: Normal free and total testosterone, even back in 2011 free testosterone was very healthy. ED may be vascular or psychiatric in nature. Growth Hormone: low probability of having any GH deficiency or hormone oversecretion. Lactotroph function: Normal prolactin. Imaging: Based on the recent MRI findings, I recommend an initial f/u MRI to be in 2 years, with consideration to having been stable over the past 6-7 years. Eyes: No need for formal visual field testing at this time due to the small size of the adenoma and having already had a recent eye exam with normal findings. Plan for 1 year f/u with repeat pituitary profile screen,     Deborah Cristina.  [address] [facility]

## 2017-09-12 NOTE — PATIENT INSTRUCTIONS
Plan to RTC in 1 year for re-evaluation    Cortisol level to be obtained at 8 AM  Will discuss result with you,    Michel Jasmine.  [address] [facility]  [address] [place] [address]

## 2017-09-12 NOTE — MR AVS SNAPSHOT
Visit Information Date & Time Provider Department Dept. Phone Encounter #  
 9/12/2017  8:30 AM Gemma Jennings, [facility] [phone] Follow-up Instructions Return in about 1 year (around 9/12/2018). Upcoming Health Maintenance Date Due INFLUENZA AGE 9 TO ADULT 8/1/2017 MEDICARE YEARLY EXAM 6/28/2018 DTaP/Tdap/Td series (2 - Td) 6/27/2027 Allergies as of 9/12/2017  Review Complete On: 9/12/2017 By: Gemma Jennings MD  
 Not on File Current Immunizations  Reviewed on 6/27/2017 Name Date Tdap 6/27/2017 Not reviewed this visit You Were Diagnosed With   
  
 Codes Comments [facility]    -  Primary ICD-10-CM: D35.2 ICD-9-CM: 227.3 Vitals BP Pulse Height(growth percentile) Weight(growth percentile) BMI Smoking Status 139/89 (BP 1 Location: Left arm, BP Patient Position: Sitting) 76 6' (1.829 m) 212 lb 4.8 oz (96.3 kg) 28.79 kg/m2 Never Smoker BMI and BSA Data Body Mass Index Body Surface Area 28.79 kg/m 2 2.21 m 2 Preferred Pharmacy Pharmacy Name Phone [facility] [address] AT [address] [place] [address] Drive [phone] Your Updated Medication List  
  
   
This list is accurate as of: 9/12/17  9:24 AM.  Always use your most recent med list.  
  
  
  
  
 ascorbic acid (vitamin C) 500 mg tablet Commonly known as:  VITAMIN C Take 2,000 mg by mouth daily. b complex vitamins tablet Take 1 Tab by mouth daily. cholecalciferol (VITAMIN D3) 5,000 unit Tab tablet Commonly known as:  VITAMIN D3 Take 5,000 Units by mouth daily. clonazePAM 2 mg tablet Commonly known as:  Dala Pleasure Take 1 Tab by mouth three (3) times daily. cyanocobalamin 1,000 mcg tablet Take 5,000 mcg by mouth daily. dextroamphetamine-amphetamine 30 mg tablet Commonly known as:  ADDERALL Take 30 mg by mouth every evening. DHEA 50 mg Tab Generic drug:  prasterone (dhea) Take  by mouth. fluconazole 150 mg tablet Commonly known as:  DIFLUCAN  
TK 2 TS PO TODAY THEN REPEAT IN 1 WEEK  
  
 gabapentin 300 mg capsule Commonly known as:  NEURONTIN  
1 Cap daily. * ketoconazole 2 % topical cream  
Commonly known as:  NIZORAL * ketoconazole 2 % shampoo Commonly known as:  NIZORAL  
LATHER ON AREA WHERE YOU TYPICALLY GET RASH. LEAVE ON 5 MINUTES THEN WASH OFF. U EVEN WHEN SKIN IS CLEAR FOR PREVENTION Lisdexamfetamine 70 mg capsule Commonly known as:  VYVANSE Take 70 mg by mouth every morning. Indications: ATTENTION-DEFICIT HYPERACTIVITY DISORDER  
  
 lurasidone 60 mg Tab tablet Commonly known as:  [place]pton Peyer Take 30 mg by mouth nightly. Indications: DEPRESSION ASSOCIATED WITH BIPOLAR DISORDER  
  
 multivitamin tablet Commonly known as:  ONE A DAY Take 1 Tab by mouth daily. PROBIOTIC PO Take  by mouth daily. tadalafil 20 mg tablet Commonly known as:  CIALIS Take 1 Tab by mouth as needed. 90 day supply  
  
 traZODone 300 mg tablet Commonly known as:  Emi Bloodgood Take 150 mg by mouth nightly. * Notice: This list has 2 medication(s) that are the same as other medications prescribed for you. Read the directions carefully, and ask your doctor or other care provider to review them with you. We Performed the Following CORTISOL, AM [id number] CPT(R)] Comments: To be obtained 8AM  
  
Follow-up Instructions Return in about 1 year (around 9/12/2018). Patient Instructions Plan to RTC in 1 year for re-evaluation Cortisol level to be obtained at 8 AM 
Will discuss result with you, Rebecca Vasquez. [address] [facility] [address] [facility]! Dear Maryana Toney: 
Thank you for requesting a [employer] account.   Our records indicate that you already have an active [employer] account. You can access your account anytime at [email or web address] Did you know that you can access your hospital and ER discharge instructions at any time in [employer]? You can also review all of your test results from your hospital stay or ER visit. Additional Information If you have questions, please visit the Frequently Asked Questions section of the [employer] website at [email or web address] Remember, [employer] is NOT to be used for urgent needs. For medical emergencies, dial 911. Now available from your iPhone and Android! Please provide this summary of care documentation to your next provider. Your primary care clinician is listed as [facility]. If you have any questions after today's visit, please call [phone].

## 2017-09-15 ENCOUNTER — NURSE NAVIGATOR (OUTPATIENT)
Dept: SURGERY | Age: 42
End: 2017-09-15

## 2017-09-21 LAB — CORTIS AM PEAK SERPL-MCNC: 9.2 UG/DL (ref 6.2–19.4)

## 2017-09-25 NOTE — PROGRESS NOTES
Normal cortisol level,  Message will be sent to patient through Akhil ECHEVERRIA  [address] [facility]  [address] [place] [address]

## 2017-11-01 RX ORDER — IBUPROFEN 800 MG/1
TABLET ORAL
Qty: 90 TAB | Refills: 3 | Status: SHIPPED | OUTPATIENT
Start: 2017-11-01 | End: 2018-01-12 | Stop reason: SDUPTHER

## 2017-11-09 ENCOUNTER — HOSPITAL ENCOUNTER (EMERGENCY)
Age: 42
Discharge: HOME | End: 2017-11-09
Attending: FAMILY MEDICINE

## 2017-11-09 ENCOUNTER — APPOINTMENT (OUTPATIENT)
Dept: GENERAL RADIOLOGY | Age: 42
End: 2017-11-09
Attending: FAMILY MEDICINE

## 2017-11-09 VITALS
TEMPERATURE: 98.9 F | HEIGHT: 72 IN | BODY MASS INDEX: 28.71 KG/M2 | OXYGEN SATURATION: 97 % | WEIGHT: 212 LBS | DIASTOLIC BLOOD PRESSURE: 83 MMHG | RESPIRATION RATE: 18 BRPM | HEART RATE: 98 BPM | SYSTOLIC BLOOD PRESSURE: 144 MMHG

## 2017-11-09 DIAGNOSIS — S13.9XXA NECK SPRAIN, INITIAL ENCOUNTER: ICD-10-CM

## 2017-11-09 DIAGNOSIS — Z91.81 H/O FALL: ICD-10-CM

## 2017-11-09 DIAGNOSIS — M54.50 ACUTE MIDLINE LOW BACK PAIN WITHOUT SCIATICA: ICD-10-CM

## 2017-11-09 DIAGNOSIS — S01.81XA LACERATION OF FOREHEAD WITHOUT COMPLICATION, INITIAL ENCOUNTER: Primary | ICD-10-CM

## 2017-11-09 RX ORDER — NAPROXEN 500 MG/1
500 TABLET ORAL 2 TIMES DAILY WITH MEALS
Qty: 20 TAB | Refills: 0 | Status: SHIPPED | OUTPATIENT
Start: 2017-11-09 | End: 2017-11-19

## 2017-11-09 RX ORDER — METHOCARBAMOL 750 MG/1
750 TABLET, FILM COATED ORAL
Qty: 20 TAB | Refills: 0 | Status: SHIPPED | OUTPATIENT
Start: 2017-11-09 | End: 2018-03-03

## 2017-11-10 NOTE — DISCHARGE INSTRUCTIONS
Neck Strain or Sprain: Rehab Exercises  Your Care Instructions  Here are some examples of typical rehabilitation exercises for your condition. Start each exercise slowly. Ease off the exercise if you start to have pain. Your doctor or physical therapist will tell you when you can start these exercises and which ones will work best for you. How to do the exercises  Neck rotation    1. Sit in a firm chair, or stand up straight. 2. Keeping your chin level, turn your head to the right, and hold for 15 to 30 seconds. 3. Turn your head to the left and hold for 15 to 30 seconds. 4. Repeat 2 to 4 times to each side. Neck stretches    1. Look straight ahead, and tip your right ear to your right shoulder. Do not let your left shoulder rise up as you tip your head to the right. 2. Hold for 15 to 30 seconds. 3. Tilt your head to the left. Do not let your right shoulder rise up as you tip your head to the left. 4. Hold for 15 to 30 seconds. 5. Repeat 2 to 4 times to each side. Forward neck flexion    1. Sit in a firm chair, or stand up straight. 2. Bend your head forward. 3. Hold for 15 to 30 seconds. 4. Repeat 2 to 4 times. Lateral (side) bend strengthening    1. With your right hand, place your first two fingers on your right temple. 2. Start to bend your head to the side while using gentle pressure from your fingers to keep your head from bending. 3. Hold for about 6 seconds. 4. Repeat 8 to 12 times. 5. Switch hands and repeat the same exercise on your left side. Forward bend strengthening    1. Place your first two fingers of either hand on your forehead. 2. Start to bend your head forward while using gentle pressure from your fingers to keep your head from bending. 3. Hold for about 6 seconds. 4. Repeat 8 to 12 times. Neutral position strengthening    1. Using one hand, place your fingertips on the back of your head at the top of your neck.   2. Start to bend your head backward while using gentle pressure from your fingers to keep your head from bending. 3. Hold for about 6 seconds. 4. Repeat 8 to 12 times. Chin tuck    1. Lie on the floor with a rolled-up towel under your neck. Your head should be touching the floor. 2. Slowly bring your chin toward your chest.  3. Hold for a count of 6, and then relax for up to 10 seconds. 4. Repeat 8 to 12 times. Follow-up care is a key part of your treatment and safety. Be sure to make and go to all appointments, and call your doctor if you are having problems. It's also a good idea to know your test results and keep a list of the medicines you take. Where can you learn more? Go to http://yuri-olamide.info/. Enter M679 in the search box to learn more about \"Neck Strain or Sprain: Rehab Exercises. \"  Current as of: March 21, 2017  Content Version: 11.4  © 0825-7033 [employer]. Care instructions adapted under license by [employer] (which disclaims liability or warranty for this information). If you have questions about a medical condition or this instruction, always ask your healthcare professional. [place] [address] any warranty or liability for your use of this information. Cuts Closed With Stitches: Care Instructions  Your Care Instructions  A cut can happen anywhere on your body. The doctor used stitches to close the cut. Using stitches also helps the cut heal and reduces scarring. Sometimes pieces of tape called Steri-Strips are put over the stitches. If the cut went deep and through the skin, the doctor may have put in two layers of stitches. The deeper layer brings the deep part of the cut together. These stitches will dissolve and don't need to be removed. The stitches in the upper layer are the ones you see on the cut. You will probably have a bandage over the stitches. You will need to have the stitches removed, usually in 7 to 14 days.   The doctor has checked you carefully, but problems can develop later. If you notice any problems or new symptoms, get medical treatment right away. Follow-up care is a key part of your treatment and safety. Be sure to make and go to all appointments, and call your doctor if you are having problems. It's also a good idea to know your test results and keep a list of the medicines you take. How can you care for yourself at home? · Keep the cut dry for the first 24 to 48 hours. After this, you can shower if your doctor okays it. Pat the cut dry. · Don't soak the cut, such as in a bathtub. Your doctor will tell you when it's safe to get the cut wet. · If your doctor told you how to care for your cut, follow your doctor's instructions. If you did not get instructions, follow this general advice:  ¨ After the first 24 to 48 hours, wash around the cut with clean water 2 times a day. Don't use hydrogen peroxide or alcohol, which can slow healing. ¨ You may cover the cut with a thin layer of petroleum jelly, such as Vaseline, and a nonstick bandage. ¨ Apply more petroleum jelly and replace the bandage as needed. · Prop up the sore area on a pillow anytime you sit or lie down during the next 3 days. Try to keep it above the level of your heart. This will help reduce swelling. · Avoid any activity that could cause your cut to reopen. · Do not remove the stitches on your own. Your doctor will tell you when to come back to have the stitches removed. · Leave Steri-Strips on until they fall off. · Be safe with medicines. Read and follow all instructions on the label. ¨ If the doctor gave you a prescription medicine for pain, take it as prescribed. ¨ If you are not taking a prescription pain medicine, ask your doctor if you can take an over-the-counter medicine. When should you call for help? Call your doctor now or seek immediate medical care if:  ? · You have new pain, or your pain gets worse.    ? · The skin near the cut is cold or pale or changes color.   ? · You have tingling, weakness, or numbness near the cut.   ? · The cut starts to bleed, and blood soaks through the bandage. Oozing small amounts of blood is normal.   ? · You have trouble moving the area near the cut.   ? · You have symptoms of infection, such as:  ¨ Increased pain, swelling, warmth, or redness around the cut. ¨ Red streaks leading from the cut. ¨ Pus draining from the cut. ¨ A fever. ? Watch closely for changes in your health, and be sure to contact your doctor if:  ? · The cut reopens. ? · You do not get better as expected. Where can you learn more? Go to http://yuri-olamide.info/. Enter R217 in the search box to learn more about \"Cuts Closed With Stitches: Care Instructions. \"  Current as of: March 20, 2017  Content Version: 11.4  © 8988-7348 [employer]. Care instructions adapted under license by [employer] (which disclaims liability or warranty for this information). If you have questions about a medical condition or this instruction, always ask your healthcare professional. [place] [address] any warranty or liability for your use of this information. Low Back Pain: Exercises  Your Care Instructions  Here are some examples of typical rehabilitation exercises for your condition. Start each exercise slowly. Ease off the exercise if you start to have pain. Your doctor or physical therapist will tell you when you can start these exercises and which ones will work best for you. How to do the exercises  Press-up    5. Lie on your stomach, supporting your body with your forearms. 6. Press your elbows down into the floor to raise your upper back. As you do this, relax your stomach muscles and allow your back to arch without using your back muscles. As your press up, do not let your hips or pelvis come off the floor. 7. Hold for 15 to 30 seconds, then relax. 8. Repeat 2 to 4 times.   Alternate arm and leg (bird dog) exercise    Do this exercise slowly. Try to keep your body straight at all times, and do not let one hip drop lower than the other. 6. Start on the floor, on your hands and knees. 7. Tighten your belly muscles. 8. Raise one leg off the floor, and hold it straight out behind you. Be careful not to let your hip drop down, because that will twist your trunk. 9. Hold for about 6 seconds, then lower your leg and switch to the other leg. 10. Repeat 8 to 12 times on each leg. 11. Over time, work up to holding for 10 to 30 seconds each time. 12. If you feel stable and secure with your leg raised, try raising the opposite arm straight out in front of you at the same time. Knee-to-chest exercise    5. Lie on your back with your knees bent and your feet flat on the floor. 6. Bring one knee to your chest, keeping the other foot flat on the floor (or keeping the other leg straight, whichever feels better on your lower back). 7. Keep your lower back pressed to the floor. Hold for at least 15 to 30 seconds. 8. Relax, and lower the knee to the starting position. 9. Repeat with the other leg. Repeat 2 to 4 times with each leg. 10. To get more stretch, put your other leg flat on the floor while pulling your knee to your chest.  Curl-ups    6. Lie on the floor on your back with your knees bent at a 90-degree angle. Your feet should be flat on the floor, about 12 inches from your buttocks. 7. Cross your arms over your chest. If this bothers your neck, try putting your hands behind your neck (not your head), with your elbows spread apart. 8. Slowly tighten your belly muscles and raise your shoulder blades off the floor. 9. Keep your head in line with your body, and do not press your chin to your chest.  10. Hold this position for 1 or 2 seconds, then slowly lower yourself back down to the floor. 11. Repeat 8 to 12 times. Pelvic tilt exercise    5. Lie on your back with your knees bent. 6. \"Brace\" your stomach. This means to tighten your muscles by pulling in and imagining your belly button moving toward your spine. You should feel like your back is pressing to the floor and your hips and pelvis are rocking back. 7. Hold for about 6 seconds while you breathe smoothly. 8. Repeat 8 to 12 times. Heel dig bridging    5. Lie on your back with both knees bent and your ankles bent so that only your heels are digging into the floor. Your knees should be bent about 90 degrees. 6. Then push your heels into the floor, squeeze your buttocks, and lift your hips off the floor until your shoulders, hips, and knees are all in a straight line. 7. Hold for about 6 seconds as you continue to breathe normally, and then slowly lower your hips back down to the floor and rest for up to 10 seconds. 8. Do 8 to 12 repetitions. Hamstring stretch in doorway    5. Lie on your back in a doorway, with one leg through the open door. 6. Slide your leg up the wall to straighten your knee. You should feel a gentle stretch down the back of your leg. 7. Hold the stretch for at least 15 to 30 seconds. Do not arch your back, point your toes, or bend either knee. Keep one heel touching the floor and the other heel touching the wall. 8. Repeat with your other leg. 9. Do 2 to 4 times for each leg. Hip flexor stretch    1. Kneel on the floor with one knee bent and one leg behind you. Place your forward knee over your foot. Keep your other knee touching the floor. 2. Slowly push your hips forward until you feel a stretch in the upper thigh of your rear leg. 3. Hold the stretch for at least 15 to 30 seconds. Repeat with your other leg. 4. Do 2 to 4 times on each side. Wall sit    1. Stand with your back 10 to 12 inches away from a wall. 2. Lean into the wall until your back is flat against it. 3. Slowly slide down until your knees are slightly bent, pressing your lower back into the wall.   4. Hold for about 6 seconds, then slide back up the wall.  5. Repeat 8 to 12 times. Follow-up care is a key part of your treatment and safety. Be sure to make and go to all appointments, and call your doctor if you are having problems. It's also a good idea to know your test results and keep a list of the medicines you take. Where can you learn more? Go to http://yuri-olamide.info/. Enter G223 in the search box to learn more about \"Low Back Pain: Exercises. \"  Current as of: March 21, 2017  Content Version: 11.4  © 6512-4758 [employer]. Care instructions adapted under license by [employer] (which disclaims liability or warranty for this information). If you have questions about a medical condition or this instruction, always ask your healthcare professional. [address] any warranty or liability for your use of this information.

## 2017-11-10 NOTE — UC PROVIDER NOTE
Patient is a 38 y.o. male presenting with head injury. The history is provided by the patient. Head Injury    The incident occurred less than 1 hour ago. The injury mechanism was a direct blow. The volume of blood lost was minimal. The quality of the pain is described as dull. The pain is mild. Pertinent negatives include no blurred vision, no vomiting and no disorientation. He has tried nothing for the symptoms. There was no loss of consciousness. He has been behaving normally. Past Medical History:   Diagnosis Date    Balance problems     Bipolar 2 disorder (HCC)     Chronic fatigue syndrome     Chronic pain     back pain worse    Depression     ED (erectile dysfunction)     Extremity pain     Fibromyalgia     GERD (gastroesophageal reflux disease)     Headache(784.0) since about 21y/o    excedrine migraine daily for HA multiple doses (3-4 daily) Neurologist 2008, had an MRI brain 2007    HTN (hypertension) 11/3/2010    no medication as of 4/2016    Hypertonicity of bladder     IGT (impair glucose tolerance) 12/2/2010    Insomnia     Neck pain     Obesity, Class II, BMI 35-39.9, with comorbidity     Osteoarthritis of back     Osteoarthritis of both knees     Pituitary microadenoma (HCC)     PTSD (post-traumatic stress disorder)      related    Pure hypercholesterolemia 12/2/2010    RLS (restless legs syndrome)     Spine pain     Urge incontinence         Past Surgical History:   Procedure Laterality Date    HX OTHER SURGICAL  07/26/2016    pt states had \"lap band\" procedure    [place] L[place] SURGERY OF E[place]  4/2002    both         Family History   Problem Relation Age of Onset    Cancer Mother     Diabetes Father     Cancer Other     Diabetes Other         Social History     Social History    Marital status: SINGLE     Spouse name: N/A    Number of children: N/A    Years of education: N/A     Occupational History    Not on file.      Social History Main Topics    Smoking status: Never Smoker    Smokeless tobacco: Never Used    Alcohol use No      Comment: rarely    Drug use: No    Sexual activity: Yes     Partners: Female     Birth control/ protection: Pill     Other Topics Concern    Not on file     Social History Narrative                ALLERGIES: Review of patient's allergies indicates no known allergies. Review of Systems   Eyes: Negative for blurred vision. Gastrointestinal: Negative for vomiting. Skin: Positive for wound (laceration on mid forehead). Vitals:    11/09/17 1904   BP: 144/83   Pulse: 98   Resp: 18   Temp: 98.9 °F (37.2 °C)   SpO2: 97%   Weight: 96.2 kg (212 lb)   Height: 6' (1.829 m)       Physical Exam   Constitutional: He is oriented to person, place, and time. He appears well-developed and well-nourished. HENT:   Head: Normocephalic. Head is with contusion. Head laceration: on mid foreahead 2        Right Ear: External ear normal.   Left Ear: External ear normal.   Mouth/Throat: Oropharynx is clear and moist. No oropharyngeal exudate. Eyes: Conjunctivae and EOM are normal. Pupils are equal, round, and reactive to light. Right eye exhibits no discharge. Left eye exhibits no discharge. No scleral icterus. Neck: Normal range of motion. Neck supple. No tracheal deviation present. No thyromegaly present. Cardiovascular: Normal rate, regular rhythm, normal heart sounds and intact distal pulses. No murmur heard. Pulmonary/Chest: Effort normal and breath sounds normal. No respiratory distress. He has no wheezes. He has no rales. Abdominal: Soft. Bowel sounds are normal. He exhibits no distension. There is no tenderness. There is no rebound and no guarding. Musculoskeletal: Normal range of motion. He exhibits no edema or tenderness. Lymphadenopathy:     He has no cervical adenopathy. Neurological: He is alert and oriented to person, place, and time. No cranial nerve deficit. Coordination normal.   Skin: Skin is warm. No rash noted. No erythema. Psychiatric: He has a normal mood and affect. His behavior is normal. Judgment and thought content normal.   Nursing note and vitals reviewed. MDM     Differential Diagnosis; Clinical Impression; Plan:     CLINICAL IMPRESSION:  Laceration of forehead without complication, initial encounter  (primary encounter diagnosis)  H/O fall  Neck sprain, initial encounter  Acute midline low back pain without sciatica      DDX    Plan:    Wound care- suture removal in 10 days  NSAID for headache. Amount and/or Complexity of Data Reviewed:    Review and summarize past medical records:  Yes  Risk of Significant Complications, Morbidity, and/or Mortality:   Presenting problems: Moderate  Management options:   Moderate  Progress:   Patient progress:  Stable      Wound Repair  Date/Time: 11/9/2017 7:21 PM  Preparation: skin prepped with Shur-Clens  Location details: face (mid forehead )  Wound length:2.5 cm or less (# 2 lacerations)  Anesthesia: local infiltration    Anesthesia:  Local Anesthetic: lidocaine 1% without epinephrine  Anesthetic total: 5 mL  Skin closure: 5-0 nylon  Number of sutures: 6 (3 on each laceraion)  Technique: simple  Approximation: close  Dressing: antibiotic ointment

## 2017-11-19 ENCOUNTER — HOSPITAL ENCOUNTER (EMERGENCY)
Age: 42
Discharge: HOME | End: 2017-11-19
Attending: FAMILY MEDICINE

## 2017-11-19 VITALS
DIASTOLIC BLOOD PRESSURE: 87 MMHG | HEIGHT: 74 IN | BODY MASS INDEX: 28.16 KG/M2 | HEART RATE: 88 BPM | SYSTOLIC BLOOD PRESSURE: 133 MMHG | WEIGHT: 219.4 LBS | RESPIRATION RATE: 16 BRPM | TEMPERATURE: 98.4 F | OXYGEN SATURATION: 95 %

## 2017-11-19 DIAGNOSIS — S01.81XD LACERATION OF FOREHEAD, SUBSEQUENT ENCOUNTER: Primary | ICD-10-CM

## 2017-11-19 DIAGNOSIS — Z48.02 VISIT FOR SUTURE REMOVAL: ICD-10-CM

## 2017-11-19 NOTE — DISCHARGE INSTRUCTIONS
Learning About Stitches and Staples Removal  When are stitches and staples removed? Your doctor will tell you when to have your stitches or staples removed, usually in 7 to 14 days. How long you'll be told to wait will depend on things like where the wound is located, how big and how deep the wound is, and what your general health is like. Do not remove the stitches on your own. Stitches on the face are usually removed within a week. But stitches and staples on other areas of the body, such as on the back or belly or over a joint, may need to stay in place longer, often a week or two. Be sure to follow your doctor's instructions. How are stitches and staples removed? It usually doesn't hurt when the doctor removes the stitches or staples. You may feel a tug as each stitch or staple is removed. · You will either be seated or lying down. · To remove stitches, the doctor will use scissors to cut each of the knots and then pull the threads out. · To remove staples, the doctor will use a tool to take out the staples one at a time. · The area may still feel tender after the stitches or staples are gone. But it should feel better within a few minutes or up to a few hours. What can you expect after stitches and staples are removed? Depending on the type and location of the cut, you will have a scar. Scars usually fade over time. Keep the area clean, but you won't need a bandage. When should you call for help? Call your doctor now or seek immediate medical care if :  · You have new pain, or your pain gets worse. · You have trouble moving the area near the scar. · You have symptoms of infection, such as:  ¨ Increased pain, swelling, warmth, or redness around the scar. ¨ Red streaks leading from the scar. ¨ Pus draining from the scar. ¨ A fever. Watch closely for changes in your health, and be sure to contact your doctor if:  · The scar opens. · You do not get better as expected.   Follow-up care is a key part of your treatment and safety. Be sure to make and go to all appointments, and call your doctor if you do not get better as expected. It's also a good idea to keep a list of the medicines you take. Where can you learn more? Go to http://yuri-olamide.info/. Enter V375 in the search box to learn more about \"Learning About Stitches and Staples Removal.\"  Current as of: March 20, 2017  Content Version: 11.4  © 8407-8511 [employer]wise[employer]. Care instructions adapted under license by [employer] (which disclaims liability or warranty for this information). If you have questions about a medical condition or this instruction, always ask your healthcare professional. [address] any warranty or liability for your use of this information.

## 2017-11-19 NOTE — UC PROVIDER NOTE
HPI Comments: Madison presents for suture removal of laceration sustained 10 days ago. Had 6 sutures place here. Denies redness, pain, drainage. The history is provided by the patient. Past Medical History:   Diagnosis Date    Balance problems     Bipolar 2 disorder (HCC)     Chronic fatigue syndrome     Chronic pain     back pain worse    Depression     ED (erectile dysfunction)     Extremity pain     Fibromyalgia     GERD (gastroesophageal reflux disease)     Headache(784.0) since about 21y/o    excedrine migraine daily for HA multiple doses (3-4 daily) Neurologist 2008, had an MRI brain 2007    HTN (hypertension) 11/3/2010    no medication as of 4/2016    Hypertonicity of bladder     IGT (impair glucose tolerance) 12/2/2010    Insomnia     Neck pain     Obesity, Class II, BMI 35-39.9, with comorbidity     Osteoarthritis of back     Osteoarthritis of both knees     Pituitary microadenoma (HCC)     PTSD (post-traumatic stress disorder)      related    Pure hypercholesterolemia 12/2/2010    RLS (restless legs syndrome)     Spine pain     Urge incontinence         Past Surgical History:   Procedure Laterality Date    HX OTHER SURGICAL  07/26/2016    pt states had \"lap band\" procedure    [place] OF [place]  4/2002    both         Family History   Problem Relation Age of Onset    Cancer Mother     Diabetes Father     Cancer Other     Diabetes Other         Social History     Social History    Marital status: SINGLE     Spouse name: N/A    Number of children: N/A    Years of education: N/A     Occupational History    Not on file.      Social History Main Topics    Smoking status: Never Smoker    Smokeless tobacco: Never Used    Alcohol use No      Comment: rarely    Drug use: No    Sexual activity: Yes     Partners: Female     Birth control/ protection: Pill     Other Topics Concern    Not on file     Social History Narrative                ALLERGIES: Review of patient's allergies indicates no known allergies. Review of Systems   Constitutional: Negative for chills and fever. Skin: Positive for wound. Neurological: Negative for dizziness and headaches. Vitals:    11/19/17 1121   BP: 133/87   Pulse: 88   Resp: 16   Temp: 98.4 °F (36.9 °C)   SpO2: 95%   Weight: 99.5 kg (219 lb 6.4 oz)   Height: 6' 2\" (1.88 m)       Physical Exam   Constitutional: He appears well-developed and well-nourished. No distress. Neurological: He is alert. Skin: He is not diaphoretic. Forehead: 6 intact sutures clean/dry; laceration healed   Psychiatric: He has a normal mood and affect. His behavior is normal. Judgment and thought content normal.   Nursing note and vitals reviewed. MDM     Differential Diagnosis; Clinical Impression; Plan:     CLINICAL IMPRESSION:  Laceration of forehead, subsequent encounter  (primary encounter diagnosis)  Visit for suture removal    Plan:  1. Sutures removed  Risk of Significant Complications, Morbidity, and/or Mortality:   Presenting problems: Moderate  Management options: Moderate  Progress:   Patient progress:  Stable      Suture/Staple Removal  Date/Time: 11/19/2017 12:34 PM  Performed by: June Ag  Authorized by: June Ag     Consent:     Consent obtained:  Verbal  Location:     Location:  Head/neck    Head/neck location:  Forehead  Procedure details:     Wound appearance:  No signs of infection    Number of sutures removed:  6  Post-procedure details:     Post-removal:  Antibiotic ointment applied    Patient tolerance of procedure:   Tolerated well, no immediate complications

## 2018-01-12 RX ORDER — IBUPROFEN 800 MG/1
TABLET ORAL
Qty: 90 TAB | Refills: 3 | Status: SHIPPED | OUTPATIENT
Start: 2018-01-12 | End: 2019-01-07 | Stop reason: SDUPTHER

## 2018-01-14 ENCOUNTER — HOSPITAL ENCOUNTER (OUTPATIENT)
Dept: GENERAL RADIOLOGY | Age: 43
Discharge: HOME | End: 2018-01-14
Attending: PHYSICAL MEDICINE & REHABILITATION
Payer: MEDICARE

## 2018-01-14 ENCOUNTER — HOSPITAL ENCOUNTER (OUTPATIENT)
Dept: MRI IMAGING | Age: 43
Discharge: HOME | End: 2018-01-14
Attending: PHYSICAL MEDICINE & REHABILITATION
Payer: MEDICARE

## 2018-01-14 DIAGNOSIS — M54.14 THORACIC RADICULITIS: ICD-10-CM

## 2018-01-14 DIAGNOSIS — M54.15 RADICULOPATHY OF THORACOLUMBAR REGION: ICD-10-CM

## 2018-01-14 PROCEDURE — 72072 X-RAY EXAM THORAC SPINE 3VWS: CPT

## 2018-01-14 PROCEDURE — 72146 MRI CHEST SPINE W/O DYE: CPT

## 2018-03-03 ENCOUNTER — HOSPITAL ENCOUNTER (EMERGENCY)
Age: 43
Discharge: HOME | End: 2018-03-03
Attending: EMERGENCY MEDICINE

## 2018-03-03 VITALS
WEIGHT: 219 LBS | DIASTOLIC BLOOD PRESSURE: 92 MMHG | HEIGHT: 74 IN | SYSTOLIC BLOOD PRESSURE: 159 MMHG | BODY MASS INDEX: 28.11 KG/M2 | RESPIRATION RATE: 20 BRPM | TEMPERATURE: 98.3 F | OXYGEN SATURATION: 99 % | HEART RATE: 92 BPM

## 2018-03-03 DIAGNOSIS — S80.12XA CONTUSION OF LEFT LEG, INITIAL ENCOUNTER: Primary | ICD-10-CM

## 2018-03-03 NOTE — DISCHARGE INSTRUCTIONS
Contusion: Care Instructions  Your Care Instructions    Contusion is the medical term for a bruise. It is the result of a direct blow or an impact, such as a fall. Contusions are common sports injuries. Most people think of a bruise as a black-and-blue spot. This happens when small blood vessels get torn and leak blood under the skin. But bones, muscles, and organs can also get bruised. This may damage deep tissues but not cause a bruise you can see. The doctor will do a physical exam to find the location of your contusion. You may also have tests to make sure you do not have a more serious injury, such as a broken bone or nerve damage. These may include X-rays or other imaging tests like a CT scan or MRI. Deep-tissue contusions may cause pain and swelling. But if there is no serious damage, they will often get better in a few weeks with home treatment. The doctor has checked you carefully, but problems can develop later. If you notice any problems or new symptoms, get medical treatment right away. Follow-up care is a key part of your treatment and safety. Be sure to make and go to all appointments, and call your doctor if you are having problems. It's also a good idea to know your test results and keep a list of the medicines you take. How can you care for yourself at home? · Put ice or a cold pack on the sore area for 10 to 20 minutes at a time to stop swelling. Put a thin cloth between the ice pack and your skin. · Be safe with medicines. Read and follow all instructions on the label. ¨ If the doctor gave you a prescription medicine for pain, take it as prescribed. ¨ If you are not taking a prescription pain medicine, ask your doctor if you can take an over-the-counter medicine. · If you can, prop up the sore area on pillows as much as possible for the next few days. Try to keep the sore area above the level of your heart. When should you call for help?   Call your doctor now or seek immediate medical care if:  ? · Your pain gets worse. ? · You have new or worse swelling. ? · You have tingling, weakness, or numbness in the area near the contusion. ? · The area near the contusion is cold or pale. ? Watch closely for changes in your health, and be sure to contact your doctor if:  ? · You do not get better as expected. Where can you learn more? Go to http://yuri-olamide.info/. Enter C340 in the search box to learn more about \"Contusion: Care Instructions. \"  Current as of: March 20, 2017  Content Version: 11.4  © 7551-9005 [employer]. Care instructions adapted under license by [employer] (which disclaims liability or warranty for this information). If you have questions about a medical condition or this instruction, always ask your healthcare professional. [address] any warranty or liability for your use of this information.
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
## 2018-03-12 ENCOUNTER — OFFICE VISIT (OUTPATIENT)
Dept: INTERNAL MEDICINE CLINIC | Age: 43
End: 2018-03-12

## 2018-03-12 VITALS
TEMPERATURE: 97.9 F | OXYGEN SATURATION: 98 % | WEIGHT: 222.6 LBS | RESPIRATION RATE: 19 BRPM | BODY MASS INDEX: 28.57 KG/M2 | SYSTOLIC BLOOD PRESSURE: 133 MMHG | HEIGHT: 74 IN | DIASTOLIC BLOOD PRESSURE: 89 MMHG | HEART RATE: 73 BPM

## 2018-03-12 DIAGNOSIS — R20.2 NUMBNESS AND TINGLING IN RIGHT HAND: Primary | ICD-10-CM

## 2018-03-12 DIAGNOSIS — R20.0 NUMBNESS AND TINGLING IN RIGHT HAND: Primary | ICD-10-CM

## 2018-03-12 DIAGNOSIS — M79.631 RIGHT FOREARM PAIN: ICD-10-CM

## 2018-03-12 DIAGNOSIS — N52.9 ERECTILE DYSFUNCTION, UNSPECIFIED ERECTILE DYSFUNCTION TYPE: ICD-10-CM

## 2018-03-12 RX ORDER — TADALAFIL 20 MG/1
20 TABLET ORAL AS NEEDED
Qty: 18 TAB | Refills: 3 | Status: SHIPPED | OUTPATIENT
Start: 2018-03-12 | End: 2018-11-13

## 2018-03-12 NOTE — PATIENT INSTRUCTIONS
1.  If another provider you have seen at [facility] [address] can evaluate your symptoms, you can see them first if preferred. Dr. Christa Reynoso or Jennifer Mccall are their upper extremity specialists, to evaluate symptoms as reviewed. 2.  Please follow the following instructions to process/authorize your referral, as reviewed today:    Referrals processing  Please verify with your insurance IF you need referral authorization submitted. For insurance plans which require this, please follow the following steps. FAILURE TO DO SO MAY RESULT IN INABILITY TO SEE THE SPECIALIST YOU HAVE BEEN REFERRED TO.   1. Call and schedule appointment with specialist  2. Call our clinic and leave message with provider name, and date of appointment  3. We will then submit the referral to your insurance. This process takes 2-5 business days. If you have questions about scheduling or authorizing referral, you can review with our referral coordinators Sally Ho. or Sana Hess) at the . You can review with them today if available/if you have time, or you can call to review with them once you have made your referral/appointment. If you are not sure if you need referral authorizations, please review with the referral coordinators, either prior to or after you have made the appointment, as reviewed.

## 2018-03-12 NOTE — PROGRESS NOTES
History of Present Illness:   Kesha Ryan is a 38 y.o. male here for evaluation:    Chief Complaint   Patient presents with    Elbow Pain     reports right elbow pain that goes down to his hand; has been radha ashu fro x4 months now;      Notes with pain above--has no clear injury or pattern of overuse. He has seen Sandrita for injections. He has been referred to [facility]  for knee pain prior (sports med provider there). Has also seen Dr. Best Aiken with [facility] for tendonitis. He ahs tries different sleeves/supports, tennis elbow support. He is able to press on tendons just distal to lateral elbow, and this helps pain. He notes  strength worse due to pain and somewhat weak also. Reviewed prior providers above. Reviewed, that ortho would have more ability to treat/manage, especially since symptoms are causing weakness at times and weakness due to pain. Reviewed possible entrapment syndrome and possible management strategies with ortho. Reviewed could see UE specialist as referred, or sports med provider with ortho if able to manage symptoms. He also notes need for Cialis refill. Refilled for 1yr year supply (18 tabs which is max allowed for 3mo quantity limit per pt, with 3 refills June 2017). Pt noted when he got last refill, stated there were no further refills remaining. Follow-up for yearly physical this summer reviewed as below. Referral(s) and referral coordination reviewed with patient at visit. Prior to Admission medications    Medication Sig Start Date End Date Taking? Authorizing Provider   ibuprofen (MOTRIN) 800 mg tablet TAKE 1 TABLET EVERY 8 HOURS AS NEEDED FOR PAIN. TAKE WITH FOOD. 1/12/18  Yes Myke Graham MD   prasterone, dhea, (DHEA) 50 mg tab Take  by mouth. Yes Historical Provider   tadalafil (CIALIS) 20 mg tablet Take 1 Tab by mouth as needed.  90 day supply 6/27/17  Yes Svetlana Moser MD   gabapentin (NEURONTIN) 300 mg capsule 1,200 mg three (3) times daily. 5/26/16  Yes Historical Provider   Lisdexamfetamine (VYVANSE) 70 mg capsule Take 70 mg by mouth every morning. Indications: ATTENTION-DEFICIT HYPERACTIVITY DISORDER   Yes Historical Provider   ascorbic acid, vitamin C, (VITAMIN C) 500 mg tablet Take 2,000 mg by mouth daily. Yes Historical Provider   LACTOBACILLUS ACIDOPHILUS (PROBIOTIC PO) Take  by mouth daily. Yes Historical Provider   cyanocobalamin 1,000 mcg tablet Take 5,000 mcg by mouth daily. Yes Historical Provider   b complex vitamins tablet Take 1 Tab by mouth daily. Yes Historical Provider   cholecalciferol, VITAMIN D3, (VITAMIN D3) 5,000 unit tab tablet Take 5,000 Units by mouth daily. Yes Historical Provider   traZODone (DESYREL) 300 mg tablet Take 150 mg by mouth nightly. Yes Historical Provider   lurasidone (LATUDA) 60 mg tab tablet Take 30 mg by mouth nightly. Indications: DEPRESSION ASSOCIATED WITH BIPOLAR DISORDER   Yes Historical Provider   multivitamin (ONE A DAY) tablet Take 1 Tab by mouth daily. Yes Historical Provider   dextroamphetamine-amphetamine (ADDERALL) 30 mg tablet Take 30 mg by mouth every evening. Yes Historical Provider   clonazePAM (KLONOPIN) 2 mg tablet Take 1 Tab by mouth three (3) times daily. Patient taking differently: Take 2 mg by mouth two (2) times a day. 8/6/14  Yes Leidy Varela MD        ROS    Vitals:    03/12/18 0830   BP: 133/89   Pulse: 73   Resp: 19   Temp: 97.9 °F (36.6 °C)   TempSrc: Oral   SpO2: 98%   Weight: 222 lb 9.6 oz (101 kg)   Height: 6' 2\" (1.88 m)   PainSc:   7   PainLoc: Hand        Physical Exam:     Physical Exam   Constitutional: He appears well-developed and well-nourished. No distress. HENT:   Head: Normocephalic and atraumatic. Eyes: Conjunctivae are normal. Right eye exhibits no discharge. Left eye exhibits no discharge. No scleral icterus. Neck: Neck supple.    Cardiovascular: Normal rate, regular rhythm, normal heart sounds and intact distal pulses. Exam reveals no gallop and no friction rub. No murmur heard. Pulmonary/Chest: Effort normal and breath sounds normal. No respiratory distress. He has no wheezes. He has no rales. Abdominal: Soft. Bowel sounds are normal. He exhibits no distension. There is no tenderness. Musculoskeletal: He exhibits tenderness. He exhibits no edema or deformity. Arms:  He has FROM right elbow, although this does cause some discomfort. Full flexion/extension, supination/pronation. No redness or swelling noted. No numbness noted during visit. Weakness with hand  right (left handed). Neurological: He is alert. He exhibits normal muscle tone. Coordination normal.   Skin: Skin is warm. No rash noted. He is not diaphoretic. No erythema. No pallor. Psychiatric: He has a normal mood and affect. His behavior is normal. Judgment and thought content normal.       Assessment and Plan:       ICD-10-CM ICD-9-CM    1. Numbness and tingling in right hand R20.0 782.0 REFERRAL TO ORTHOPEDICS    R20.2     2. Right forearm pain M79.631 729.5 REFERRAL TO ORTHOPEDICS   3. Erectile dysfunction, unspecified erectile dysfunction type N52.9 607.84 tadalafil (CIALIS) 20 mg tablet       1. Due to weakness, neurologic symptoms, ortho eval reviewed for mgt/testing. 3.  Refilled today as reviewed above. Follow-up Disposition:  Return in about 4 months (around 6/28/2018), or if symptoms worsen or fail to improve, for yearly physical.  reviewed medications and side effects in detail    For additional documentation of information and/or recommendations discussed this visit, please see notes in instructions. Plan and evaluation (above) reviewed with pt at visit  Patient voiced understanding of plan and provided with time to ask/review questions. After Visit Summary (AVS) provided to pt after visit with additional instructions as needed/reviewed.

## 2018-03-12 NOTE — MR AVS SNAPSHOT
[address] [facility] [address] 
[phone] Patient: Albaro Henson MRN: [id number] :[id number] Visit Information Date & Time Provider Department Dept. Phone Encounter #  
 3/12/2018  8:15 AM Beryle Graces, [address] [place] [address] and Internal Medicine [phone] Follow-up Instructions Return in about 4 months (around 2018), or if symptoms worsen or fail to improve, for yearly physical.  
  
Your Appointments 2018  9:30 AM  
Follow Up with Margaree Hatchet, MD [facility]) Appt Note: f/u           thyroid                one year [employer] [phone] [id number] [address] [facility] Upcoming Health Maintenance Date Due Influenza Age 5 to Adult 2017 MEDICARE YEARLY EXAM 2018 DTaP/Tdap/Td series (2 - Td) 2027 Allergies as of 3/12/2018  Review Complete On: 3/12/2018 By: Beryle Graces, MD  
 No Known Allergies Current Immunizations  Reviewed on 2017 Name Date Tdap 2017 Not reviewed this visit You Were Diagnosed With   
  
 Codes Comments Numbness and tingling in right hand    -  Primary ICD-10-CM: R20.0, R20.2 ICD-9-CM: 782.0 Right forearm pain     ICD-10-CM: V26.133 ICD-9-CM: 729.5 Erectile dysfunction, unspecified erectile dysfunction type     ICD-10-CM: N52.9 ICD-9-CM: 607.84 Vitals BP Pulse Temp Resp Height(growth percentile) Weight(growth percentile) 133/89 (BP 1 Location: Left arm, BP Patient Position: Sitting) 73 97.9 °F (36.6 °C) (Oral) 19 6' 2\" (1.88 m) 222 lb 9.6 oz (101 kg) SpO2 BMI Smoking Status 98% 28.58 kg/m2 Never Smoker BMI and BSA Data Body Mass Index Body Surface Area  28.58 kg/m 2 2.3 m 2  
  
  
 Preferred Pharmacy Pharmacy Name Phone [address], [facility] [phone] Your Updated Medication List  
  
   
This list is accurate as of 3/12/18  9:06 AM.  Always use your most recent med list.  
  
  
  
  
 ascorbic acid (vitamin C) 500 mg tablet Commonly known as:  VITAMIN C Take 2,000 mg by mouth daily. b complex vitamins tablet Take 1 Tab by mouth daily. cholecalciferol (VITAMIN D3) 5,000 unit Tab tablet Commonly known as:  VITAMIN D3 Take 5,000 Units by mouth daily. clonazePAM 2 mg tablet Commonly known as:  Chandu Horicon Take 1 Tab by mouth three (3) times daily. cyanocobalamin 1,000 mcg tablet Take 5,000 mcg by mouth daily. dextroamphetamine-amphetamine 30 mg tablet Commonly known as:  ADDERALL Take 30 mg by mouth every evening. DHEA 50 mg Tab Generic drug:  prasterone (dhea) Take  by mouth.  
  
 gabapentin 300 mg capsule Commonly known as:  NEURONTIN  
1,200 mg three (3) times daily. ibuprofen 800 mg tablet Commonly known as:  MOTRIN  
TAKE 1 TABLET EVERY 8 HOURS AS NEEDED FOR PAIN. TAKE WITH FOOD. Lisdexamfetamine 70 mg capsule Commonly known as:  VYVANSE Take 70 mg by mouth every morning. Indications: ATTENTION-DEFICIT HYPERACTIVITY DISORDER  
  
 lurasidone 60 mg Tab tablet Commonly known as:  Samara Larose Take 30 mg by mouth nightly. Indications: DEPRESSION ASSOCIATED WITH BIPOLAR DISORDER  
  
 multivitamin tablet Commonly known as:  ONE A DAY Take 1 Tab by mouth daily. PROBIOTIC PO Take  by mouth daily. tadalafil 20 mg tablet Commonly known as:  CIALIS Take 1 Tab by mouth as needed. 90 day supply  
  
 traZODone 300 mg tablet Commonly known as:  Jie Saldivar Take 150 mg by mouth nightly. Prescriptions Sent to Pharmacy Refills  
 tadalafil (CIALIS) 20 mg tablet 3 Sig: Take 1 Tab by mouth as needed. 90 day supply Class: Normal  
 Pharmacy: [address], [address] [facility] #: [phone] Route: Oral  
  
We Performed the Following REFERRAL TO ORTHOPEDICS [RAR188 Custom] Comments:  
 Please evaluate right forearm pain with hand tingling, weakness--for possible entrapment syndrome at right elbow--Dr. Dinah Shields or Dr. Flavio Mares. Follow-up Instructions Return in about 4 months (around 6/28/2018), or if symptoms worsen or fail to improve, for yearly physical.  
  
  
Referral Information Referral ID Referred By Referred To  
  
 [id number] Marin VAUGHAN MD   
   [facility] [address] [facility], [address] Phone: [phone] Fax: [phone] Visits Status Start Date End Date 1 New Request 3/12/18 3/12/19 If your referral has a status of pending review or denied, additional information will be sent to support the outcome of this decision. Patient Instructions 1. If another provider you have seen at [address] [place] [address] Dr can evaluate your symptoms, you can see them first if preferred. Dr. Filomena Andrews or Dinah Shields are their upper extremity specialists, to evaluate symptoms as reviewed. 2.  Please follow the following instructions to process/authorize your referral, as reviewed today: 
 
Referrals processing Please verify with your insurance IF you need referral authorization submitted. For insurance plans which require this, please follow the following steps. FAILURE TO DO SO MAY RESULT IN INABILITY TO SEE THE SPECIALIST YOU HAVE BEEN REFERRED TO.  
1. Call and schedule appointment with specialist 
2. Call our clinic and leave message with provider name, and date of appointment 3. We will then submit the referral to your insurance. This process takes 2-5 business days.   
  
If you have questions about scheduling or authorizing referral, you can review with our referral coordinators Laymond Olszewski. or Daron Crow) at the . You can review with them today if available/if you have time, or you can call to review with them once you have made your referral/appointment. If you are not sure if you need referral authorizations, please review with the referral coordinators, either prior to or after you have made the appointment, as reviewed. Introducing [facility]! Dear Denzel Shaikh: 
Thank you for requesting a navigaya account. Our records indicate that you already have an active navigaya account. You can access your account anytime at [email or web address] Did you know that you can access your hospital and ER discharge instructions at any time in navigaya? You can also review all of your test results from your hospital stay or ER visit. Additional Information If you have questions, please visit the Frequently Asked Questions section of the navigaya website at [email or web address] Remember, navigaya is NOT to be used for urgent needs. For medical emergencies, dial 911. Now available from your iPhone and Android! Please provide this summary of care documentation to your next provider. Your primary care clinician is listed as [address]. If you have any questions after today's visit, please call [phone].
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
## 2018-09-12 ENCOUNTER — OFFICE VISIT (OUTPATIENT)
Dept: ENDOCRINOLOGY | Age: 43
End: 2018-09-12

## 2018-09-12 VITALS
HEART RATE: 90 BPM | DIASTOLIC BLOOD PRESSURE: 82 MMHG | BODY MASS INDEX: 28.62 KG/M2 | HEIGHT: 74 IN | WEIGHT: 223 LBS | SYSTOLIC BLOOD PRESSURE: 135 MMHG

## 2018-09-12 DIAGNOSIS — D35.2 PITUITARY MICROADENOMA (HCC): Primary | ICD-10-CM

## 2018-09-12 RX ORDER — TIZANIDINE 4 MG/1
TABLET ORAL
COMMUNITY
Start: 2018-09-04 | End: 2018-11-13

## 2018-09-12 RX ORDER — OXYBUTYNIN CHLORIDE 15 MG/1
TABLET, EXTENDED RELEASE ORAL DAILY
COMMUNITY
Start: 2018-07-13

## 2018-09-12 RX ORDER — DICLOFENAC SODIUM 75 MG/1
TABLET, DELAYED RELEASE ORAL
Refills: 1 | COMMUNITY
Start: 2018-08-20 | End: 2018-11-13

## 2018-09-12 RX ORDER — GABAPENTIN 800 MG/1
800 TABLET ORAL 3 TIMES DAILY
COMMUNITY
Start: 2018-06-28

## 2018-09-12 RX ORDER — TRAZODONE HYDROCHLORIDE 150 MG/1
TABLET ORAL
COMMUNITY
Start: 2018-06-15

## 2018-09-12 NOTE — PATIENT INSTRUCTIONS
Plan for blood work at your convenience at 8 AM at the laboratory,  Will review results and let you know about it,     Will plan for a 1 year follow up visit, call with concerns,     Joel Lomeli.  [address] [facility] End[facility]  [address] [place] [address]

## 2018-09-12 NOTE — PROGRESS NOTES
CHIEF COMPLAINT: f/u Pituitary Microadenoma    HISTORY OF PRESENT ILLNESS:   Yvon Rivero is a 41 y.o. male with a PMHx as noted below who presents for f/u of a pituitary microadenomaadenoma. Initial History:  The patient noted that he was not aware of any pituitary issues until June of this year 2017 when his doctor reviewed old records, identified an old MRI from 2011 suggestive of a pituitary microadenoma and obtained a new one. An MRI was obtained on 6/29/17. The radiologist reports: \"Pituitary: Normal sized pituitary gland. Slight fullness of the left side of the  gland associated with a subtle hypoenhancing focus best seen on the coronal  images, approximately 6 mm transverse, 5 mm craniocaudad and 7 mm AP. .... Addendum: Images from the prior study dated 8/16/2011 are now available for comparison. The margins of the hypoenhancing lesion in the left side of the pituitary gland  are less clearly defined currently as compared with the previous study. Size has  probably not changed significantly. There has been no interval enlargement. Coronal images suggest that the hypoenhancing area may be slightly smaller. \"    With respect to headaches or visual fields, the patient reports sporadic headaches, hx of migraines. Reports sensitivity to lights. Vision is occasionally blurry. Had laser eye surgery and recent eye exam noting a normal vision exam result. The patient admits to erectile dysfunction, uses cialis occasionally. Reports taking DHEA supplement. They admit to fatigue and generalized weakness. He denied any growth of breast tissue or secretion from their breast.   Had weight loss surgery last 2016 noting he lost 90 pounds, reports he had larger breasts at the time, improved after surgery. They noted that shoe size and ring size is smaller after surgery, not larger. They dened diagnosis of HTN and diabetes.   Treated for bipolar-depression and anxiety,    Family history is not certain for pituitary disorders. Labs: Pituitary profile was checked 6/27/17 and was found to be normal    INTERVAL HISTORY:  Patient is feeling well today. He notes some ED visits related to basketball related injuries. His main concern today is his ongoing erectile dysfunction for which he uses cialis. He notes that he thinks about sexual things, but erections are the issue. We noted that his testosterone levels have been very healthy as of last visit. He notes he has also been using a lot herbal supplements. He admits to having erections when he is not needing to perform, but during intercourse he things a lot about it and that's when he looses his erection. He has a history of migraines which he reports is gone away. Vision is stable. No recent labs to review at this time. PAST MEDICAL/SURGICAL HISTORY:   Past Medical History:   Diagnosis Date    Balance problems     Bipolar 2 disorder ([address]     Chronic fatigue syndrome     Chronic pain     back pain worse    Depression     ED (erectile dysfunction)     Extremity pain     Fibromyalgia     GERD (gastroesophageal reflux disease)     Headache(784.0) since about 21y/o    excedrine migraine daily for HA multiple doses (3-4 daily) Neurologist 2008, had an MRI brain 2007    HTN (hypertension) 11/3/2010    no medication as of 4/2016    Hypertonicity of bladder     IGT (impair glucose tolerance) 12/2/2010    Insomnia     Neck pain     Obesity, Class II, BMI 35-39.9, with comorbidity     Osteoarthritis of back     Osteoarthritis of both knees     Pituitary microadenoma (HCC)     PTSD (post-traumatic stress disorder)      related    Pure hypercholesterolemia 12/2/2010    RLS (restless legs syndrome)     Spine pain     Eval with spinal injections with Dr. Christensen Shown [facility] 3[facility]18.     Urge incontinence      Past Surgical History:   Procedure Laterality Date    HX OTHER SURGICAL  07/26/2016    pt states had \"lap band\" procedure    [place] LASER SURGERY OF EYE  4/2002    both       ALLERGIES:   No Known Allergies    MEDICATIONS ON ADMISSION:     Current Outpatient Prescriptions:     gabapentin (NEURONTIN) 800 mg tablet, 800 mg four (4) times daily. , Disp: , Rfl:     diclofenac EC (VOLTAREN) 75 mg EC tablet, TK 1 T PO BID - TAKE AFTER EATING, Disp: , Rfl: 1    oxybutynin chloride XL (DITROPAN XL) 15 mg CR tablet, , Disp: , Rfl:     traZODone (DESYREL) 150 mg tablet, , Disp: , Rfl:     prasterone, dhea, (DHEA) 50 mg tab, Take  by mouth., Disp: , Rfl:     Lisdexamfetamine (VYVANSE) 70 mg capsule, Take 70 mg by mouth every morning. Indications: ATTENTION-DEFICIT HYPERACTIVITY DISORDER, Disp: , Rfl:     ascorbic acid, vitamin C, (VITAMIN C) 500 mg tablet, Take 2,000 mg by mouth daily. , Disp: , Rfl:     LACTOBACILLUS ACIDOPHILUS (PROBIOTIC PO), Take  by mouth daily. , Disp: , Rfl:     cyanocobalamin 1,000 mcg tablet, Take 5,000 mcg by mouth daily. , Disp: , Rfl:     b complex vitamins tablet, Take 1 Tab by mouth daily. , Disp: , Rfl:     cholecalciferol, VITAMIN D3, (VITAMIN D3) 5,000 unit tab tablet, Take 5,000 Units by mouth daily. , Disp: , Rfl:     lurasidone (LATUDA) 60 mg tab tablet, Take 30 mg by mouth nightly. Indications: DEPRESSION ASSOCIATED WITH BIPOLAR DISORDER, Disp: , Rfl:     multivitamin (ONE A DAY) tablet, Take 1 Tab by mouth daily. , Disp: , Rfl:     dextroamphetamine-amphetamine (ADDERALL) 30 mg tablet, Take 30 mg by mouth every evening., Disp: , Rfl:     clonazePAM (KLONOPIN) 2 mg tablet, Take 1 Tab by mouth three (3) times daily. (Patient taking differently: Take 2 mg by mouth daily.), Disp: 3 Tab, Rfl: 0    tiZANidine (ZANAFLEX) 4 mg tablet, , Disp: , Rfl:     tadalafil (CIALIS) 20 mg tablet, Take 1 Tab by mouth as needed. 90 day supply, Disp: 18 Tab, Rfl: 3    ibuprofen (MOTRIN) 800 mg tablet, TAKE 1 TABLET EVERY 8 HOURS AS NEEDED FOR PAIN.  TAKE WITH FOOD., Disp: 90 Tab, Rfl: 3    gabapentin (NEURONTIN) 300 mg capsule, 1,200 mg three (3) times daily. , Disp: , Rfl:     traZODone (DESYREL) 300 mg tablet, Take 150 mg by mouth nightly., Disp: , Rfl:     SOCIAL HISTORY:   Social History     Social History    Marital status: SINGLE     Spouse name: N/A    Number of children: N/A    Years of education: N/A     Occupational History    Not on file. Social History Main Topics    Smoking status: Never Smoker    Smokeless tobacco: Never Used    Alcohol use No      Comment: rarely    Drug use: No    Sexual activity: Yes     Partners: Female     Birth control/ protection: Pill     Other Topics Concern    Not on file     Social History Narrative       FAMILY HISTORY:  Family History   Problem Relation Age of Onset    Cancer Mother     Diabetes Father     Cancer Other     Diabetes Other        REVIEW OF SYSTEMS: Complete ROS assessed and noted for that which is described above, all else are negative. Eyes: normal  ENT: normal  CVS: normal  Resp: normal  GI: normal  : normal  GYN: normal  Endocrine: normal  Integument: normal  Musculoskeletal: normal  Neuro: normal  Psych: normal      PHYSICAL EXAMINATION:    VITAL SIGNS:  Visit Vitals    /82 (BP 1 Location: Left arm, BP Patient Position: Sitting)    Pulse 90    Ht 6' 2\" (1.88 m)    Wt 223 lb (101.2 kg)    BMI 28.63 kg/m2       GENERAL: NCAT, Sitting comfortably, NAD  EYES: EOMI, non-icteric, visual fields intact on basic examination  Ear/Nose/Throat: NCAT, no inflammation, no masses  LYMPH NODES: No LAD  CARDIOVASCULAR: S1 S2, RRR, No murmur, 2+ radial pulses  RESPIRATORY: CTA b/l, no wheeze/rales  GASTROINTESTINAL:  NT, ND,  MUSCULOSKELETAL: Normal ROM, no atrophy  SKIN: warm, no edema/rash/ or other skin changes  NEUROLOGIC: 5/5 power all extremities, no tremors, AAOx3  PSYCHIATRIC: Normal affect, Normal insight and judgement         REVIEW OF LABORATORY AND RADIOLOGY DATA:   Labs and documentation have been reviewed as described above.      ASSESSMENT AND PLAN: Ashkan Gunn is a 41 y.o. male with a PMHx as noted above who presents for f/u evaluation of a pituitary adenoma. Pituitary Microadenoma    Patient is stable other than what he feels are gonadal issues, however this seems more likely related to his anxiety based on his history and prior normal healthy testosterone panel. We will recheck however. Laboratory Evaluation:  Thyroid: Plan to recheck TSH/FT4 level  Adrenal: Repeat AM cortisol  Gonadal Function: Recheck T-panel  Growth Hormone: low probability of having any GH deficiency, will not check routinely  Lactotroph function: Recheck prolactin    Imaging: Due to stability from 2011 - 2017, we can check again sometime in 8020-6047 as long as without symptoms  Eyes: No need for formal visual field testing at this time. Plan for 1 year f/u with repeat pituitary profile screen,   >25 minutes spent together with patient today of which >50% of this time was spent in counseling and coordination of care. Maria Isabel Day.  [address] [facility]

## 2018-09-19 ENCOUNTER — DOCUMENTATION ONLY (OUTPATIENT)
Dept: SURGERY | Age: 43
End: 2018-09-19

## 2018-09-19 NOTE — LETTER
[facility] 
 
 
Dear Patient, Your health is our main concern. It is important for your health to have follow-up lab work and to see you surgeon at 2 months, 4 months, 6 months, 9 months and annually after your weight loss surgery. Additionally, the Department of Bariatric Surgery at our hospital is a member of the [employer] [address] Surgical Quality Improvement Program ([facility] NSQIP). As a participant in this program, we gather information on the outcomes of our patients after surgery. Please call the office for a follow up appointment at [phone]. If you have moved out of the area or have changed surgeons please call us and let us know the name of your doctor. Your health and feedback are important to us. We greatly appreciate your response. Thank you, [facility]

## 2018-09-19 NOTE — PROGRESS NOTES
Per [facility] requirements;  E-mail and letter sent for follow up appointment. Kelvin Neumann [facility] [facility] Bryan  [facility]  Formerly [facility]      Dear Patient,    Your health is our main concern. It is important for your health to have follow-up lab work and to see you surgeon at 2 months, 4 months, 6 months, 9 months and annually after your weight loss surgery. Additionally, the Department of Bariatric Surgery at our hospital is a member of the [employer] [address] Surgical Quality Improvement Program ([facility] NSQIP). As a participant in this program, we gather information on the outcomes of our patients after surgery. Please call the office for a follow up appointment at [phone]. If you have moved out of the area or have changed surgeons please call us and let us know the name of your doctor. Your health and feedback are important to us. We greatly appreciate your response.        Thank you,  Kelvin Velásquez Loss [facility]

## 2018-09-27 LAB
ALBUMIN SERPL-MCNC: 4.8 G/DL (ref 3.5–5.5)
ALBUMIN/GLOB SERPL: 2.8 {RATIO} (ref 1.2–2.2)
ALP SERPL-CCNC: 82 IU/L (ref 39–117)
ALT SERPL-CCNC: 21 IU/L (ref 0–44)
AST SERPL-CCNC: 23 IU/L (ref 0–40)
BILIRUB SERPL-MCNC: 0.3 MG/DL (ref 0–1.2)
BUN SERPL-MCNC: 29 MG/DL (ref 6–24)
BUN/CREAT SERPL: 24 (ref 9–20)
CALCIUM SERPL-MCNC: 9.6 MG/DL (ref 8.7–10.2)
CHLORIDE SERPL-SCNC: 102 MMOL/L (ref 96–106)
CO2 SERPL-SCNC: 25 MMOL/L (ref 20–29)
CORTIS SERPL-MCNC: 13.8 UG/DL
CREAT SERPL-MCNC: 1.19 MG/DL (ref 0.76–1.27)
FSH SERPL-ACNC: 16.5 MIU/ML (ref 1.5–12.4)
GLOBULIN SER CALC-MCNC: 1.7 G/DL (ref 1.5–4.5)
GLUCOSE SERPL-MCNC: 92 MG/DL (ref 65–99)
LH SERPL-ACNC: 10.4 MIU/ML (ref 1.7–8.6)
POTASSIUM SERPL-SCNC: 5.4 MMOL/L (ref 3.5–5.2)
PROLACTIN SERPL-MCNC: 23.8 NG/ML (ref 4–15.2)
PROT SERPL-MCNC: 6.5 G/DL (ref 6–8.5)
SODIUM SERPL-SCNC: 142 MMOL/L (ref 134–144)
T4 FREE SERPL-MCNC: 1.63 NG/DL (ref 0.82–1.77)
TESTOST FREE SERPL-MCNC: 14.7 PG/ML (ref 6.8–21.5)
TESTOST SERPL-MCNC: 459 NG/DL (ref 264–916)
TSH SERPL DL<=0.005 MIU/L-ACNC: 3.01 UIU/ML (ref 0.45–4.5)

## 2018-10-02 NOTE — PROGRESS NOTES
Pituitary labs are stable,   Total T 459, Free T 14.7, stable gonadal function,   Cortisol, AM  13.5,   LH/FSH slightly elevated for unclear reasons however appear to go slightly up and down over the past 8 years per trend. Prolactin level 23, some fluctuation in the past also, possibly medicine related,  TSH 3.0, FT4 1.63, stable,  Na 142    Sending patient a message through [employer].  [address] Endocr[facility]  [address] [place] [address]

## 2018-10-24 ENCOUNTER — HOSPITAL ENCOUNTER (OUTPATIENT)
Age: 43
Setting detail: OUTPATIENT SURGERY
Discharge: HOME | End: 2018-10-24
Attending: SPECIALIST | Admitting: SPECIALIST
Payer: MEDICARE

## 2018-10-24 ENCOUNTER — APPOINTMENT (OUTPATIENT)
Dept: GENERAL RADIOLOGY | Age: 43
End: 2018-10-24
Attending: SPECIALIST
Payer: MEDICARE

## 2018-10-24 VITALS
HEART RATE: 98 BPM | TEMPERATURE: 98.3 F | HEIGHT: 74 IN | RESPIRATION RATE: 16 BRPM | OXYGEN SATURATION: 96 % | DIASTOLIC BLOOD PRESSURE: 98 MMHG | SYSTOLIC BLOOD PRESSURE: 152 MMHG | BODY MASS INDEX: 28.76 KG/M2 | WEIGHT: 224.1 LBS

## 2018-10-24 DIAGNOSIS — Z46.51 FITTING AND ADJUSTMENT OF GASTRIC LAP BAND: ICD-10-CM

## 2018-10-24 PROCEDURE — 74011000255 HC RX REV CODE- 255: Performed by: SPECIALIST

## 2018-10-24 PROCEDURE — 76000 FLUOROSCOPY <1 HR PHYS/QHP: CPT

## 2018-10-24 PROCEDURE — 43999 UNLISTED PROCEDURE STOMACH: CPT | Performed by: SPECIALIST

## 2018-10-24 NOTE — PROCEDURES
Lap Band Encounter (fluroscopy clinic)    Tre Ordaz is gastric banding patient who had his procedure on 07/26/16.  his weight today is 101.7 kg (224 lb 1.6 oz), which correlates to  % EBW loss. he is here today for Lap Band Adjustment / Fill with Fluoroscopy Guidance. he notes the following issues related to the banding procedure; - pt here for routine adjustment. Surgery related complications; NA    Visit Vitals  BP (!) 152/98   Pulse 98   Temp 98.3 °F (36.8 °C)   Resp 16   Ht 6' 2\" (1.88 m)   Wt 101.7 kg (224 lb 1.6 oz)   SpO2 96%   BMI 28.77 kg/m²       Past Medical History:   Diagnosis Date    Balance problems     Bipolar 2 disorder (HCC)     Chronic fatigue syndrome     Chronic pain     back pain worse    Depression     ED (erectile dysfunction)     Extremity pain     Fibromyalgia     GERD (gastroesophageal reflux disease)     Headache(784.0) since about 23y/o    excedrine migraine daily for HA multiple doses (3-4 daily) Neurologist 2008, had an MRI brain 2007    HTN (hypertension) 11/3/2010    no medication as of 4/2016    Hypertonicity of bladder     IGT (impair glucose tolerance) 12/2/2010    Insomnia     Neck pain     Obesity, Class II, BMI 35-39.9, with comorbidity     Osteoarthritis of back     Osteoarthritis of both knees     Pituitary microadenoma (HCC)     PTSD (post-traumatic stress disorder)      related    Pure hypercholesterolemia 12/2/2010    RLS (restless legs syndrome)     Spine pain     Eval with spinal injections with [facility] Gema Leslie [facility] 3-5-18.     Urge incontinence      Past Surgical History:   Procedure Laterality Date    HX OTHER SURGICAL  07/26/2016    pt states had \"lap band\" procedure    [place] LASER SURGERY OF EYE  4/2002    both     Current Facility-Administered Medications   Medication Dose Route Frequency Provider Last Rate Last Dose    barium Sulfate (E-Z-HD) 98 % contrast susp    PRN Shruti Mujica MD   50 mL at 10/24/18 1307 Review of Symptoms:     General - No history or complaints of unexpected fever or chills  Cardiac - No history or complaints of chest pain, palpitations, or shortness of breath  Pulmonary - No history or complaints of shortness of breath or productive cough  Gastrointestinal - as noted above        Physical Exam:    General:  alert, cooperative, no distress, appears stated age   Abdomen:   abdomen is soft without significant tenderness, masses, organomegaly or guarding; port in place   Incisions: healing well       Assessment:     1. History of Morbid obesity, status post gastric banding, given the fluro findings we will proceed with the following adjustment. Plan:     Previous Fill Volume: 9.6 ml   Removed:       Total fill volume after today's adjustment: 10.6  Added: 1 ml                Follow-up in PRN

## 2018-11-13 ENCOUNTER — OFFICE VISIT (OUTPATIENT)
Dept: INTERNAL MEDICINE CLINIC | Age: 43
End: 2018-11-13

## 2018-11-13 VITALS
HEIGHT: 74 IN | DIASTOLIC BLOOD PRESSURE: 92 MMHG | HEART RATE: 94 BPM | SYSTOLIC BLOOD PRESSURE: 149 MMHG | RESPIRATION RATE: 18 BRPM | WEIGHT: 219.38 LBS | TEMPERATURE: 98.8 F | BODY MASS INDEX: 28.15 KG/M2 | OXYGEN SATURATION: 96 %

## 2018-11-13 DIAGNOSIS — R03.0 ELEVATED BP WITHOUT DIAGNOSIS OF HYPERTENSION: ICD-10-CM

## 2018-11-13 DIAGNOSIS — Z01.818 PREOP EXAMINATION: ICD-10-CM

## 2018-11-13 DIAGNOSIS — M25.521 CHRONIC ELBOW PAIN, RIGHT: Primary | ICD-10-CM

## 2018-11-13 DIAGNOSIS — G89.29 CHRONIC ELBOW PAIN, RIGHT: Primary | ICD-10-CM

## 2018-11-13 RX ORDER — FERROUS SULFATE, DRIED 160(50) MG
1 TABLET, EXTENDED RELEASE ORAL
COMMUNITY

## 2018-11-13 NOTE — PROGRESS NOTES
History of Present Illness:   Tri Antonio is a 41 y.o. male here for evaluation:    Chief Complaint   Patient presents with    Pre-op Exam     Patient scheduled to have surgery on right elbow 12/7/18     Pre-operative Evaluation     Date of Exam: 11/13/18     Tri Antonio is a 41 y.o. male who present for pre-operative evaluation. Procedure/Surgery:  Right lateral epicondylectomy, TER with repair  Date of Procedure/Surgery:  12-7-18--afternoon  Surgeon: Dr. Jesse Cantu MD  Hospital/Surgical Facility: [facility]--planned axillary block. Primary Physician: Rai Barakat MD       Past Medical History:   Diagnosis Date    Balance problems     Bipolar 2 disorder ([address]     Chronic fatigue syndrome     Chronic pain     back pain worse    Depression     ED (erectile dysfunction)     Extremity pain     Fibromyalgia     GERD (gastroesophageal reflux disease)     Headache(784.0) since about 21y/o    excedrine migraine daily for HA multiple doses (3-4 daily) Neurologist 2008, had an MRI brain 2007    HTN (hypertension) 11/3/2010    no medication as of 4/2016    Hypertonicity of bladder     IGT (impair glucose tolerance) 12/2/2010    Insomnia     Neck pain     Obesity, Class II, BMI 35-39.9, with comorbidity     Osteoarthritis of back     Osteoarthritis of both knees     [facility]oma [facility]     PTSD (post-traumatic stress disorder)      related    Pure hypercholesterolemia 12/2/2010    RLS (restless legs syndrome)     Spine pain     Eval with spinal injections with Dr. Verito Aparicio at [facility] 3-5-18.  Urge incontinence        Past Surgical History:   Procedure Laterality Date    HX OTHER SURGICAL  07/26/2016    pt states had \"lap band\" procedure    [place] LASER SURGERY OF EYE  4/2002    both         No Known Allergies    Latex Allergy: No      No recent illnesses/problems noted by pt. No concerns for surgery noted by family at this time. No interim illnesses noted. History of Anesthesia Complications: None    Family History of Anesthesia Complication:  None     History of abnormal bleeding : None    Family History of abnormal bleeding:  None   History of Blood Transfusions--patient: No        Recent use of aspirin (ASA), NSAIDs, or steroids:  None   Reviewed ortho rec's to stop NSAIDs/ASA 5-7 days prior. Tetanus up to date:  yes    Immunization History   Administered Date(s) Administered    Tdap 06/27/2017       Family History   Problem Relation Age of Onset    Cancer Mother     Diabetes Father     Cancer Other     Diabetes Other          REVIEW OF SYSTEMS:  A comprehensive review of systems was negative except for that written in the HPI. Prior to Admission medications    Medication Sig Start Date End Date Taking? Authorizing Provider   aspirin-acetaminophen-caffeine (EXCEDRIN ES) 250-250-65 mg per tablet Take 1 Tab by mouth. Yes Provider, Historical   calcium-vitamin D (OS-CRYSTAL 500+D) 500 mg(1,250mg) -200 unit per tablet Take 1 Tab by mouth. Yes Provider, Historical   gabapentin (NEURONTIN) 800 mg tablet 800 mg four (4) times daily. 6/28/18  Yes Provider, Historical   oxybutynin chloride XL (DITROPAN XL) 15 mg CR tablet  7/13/18  Yes Provider, Historical   traZODone (DESYREL) 150 mg tablet  6/15/18  Yes Provider, Historical   ibuprofen (MOTRIN) 800 mg tablet TAKE 1 TABLET EVERY 8 HOURS AS NEEDED FOR PAIN. TAKE WITH FOOD. 1/12/18  Yes Aurora Belle MD   prasterone, dhea, (DHEA) 50 mg tab Take  by mouth. Yes Provider, Historical   Lisdexamfetamine (VYVANSE) 70 mg capsule Take 70 mg by mouth every morning. Indications: ATTENTION-DEFICIT HYPERACTIVITY DISORDER   Yes Provider, Historical   ascorbic acid, vitamin C, (VITAMIN C) 500 mg tablet Take 2,000 mg by mouth daily. Yes Provider, Historical   LACTOBACILLUS ACIDOPHILUS (PROBIOTIC PO) Take  by mouth daily.    Yes Provider, Historical   cyanocobalamin 1,000 mcg tablet Take 5,000 mcg by mouth daily. Yes Provider, Historical   cholecalciferol, VITAMIN D3, (VITAMIN D3) 5,000 unit tab tablet Take 5,000 Units by mouth daily. Yes Provider, Historical   traZODone (DESYREL) 300 mg tablet Take 150 mg by mouth nightly. Yes Provider, Historical   lurasidone (LATUDA) 60 mg tab tablet Take 30 mg by mouth nightly. Indications: DEPRESSION ASSOCIATED WITH BIPOLAR DISORDER   Yes Provider, Historical   multivitamin (ONE A DAY) tablet Take 1 Tab by mouth daily. Yes Provider, Historical   dextroamphetamine-amphetamine (ADDERALL) 30 mg tablet Take 30 mg by mouth every evening. Yes Provider, Historical   clonazePAM (KLONOPIN) 2 mg tablet Take 1 Tab by mouth three (3) times daily. Patient taking differently: Take 2 mg by mouth daily. 8/6/14  Yes Yasmine Carreon MD   tadalafil (CIALIS) 20 mg tablet Take 1 Tab by mouth as needed. 90 day supply 3/12/18   Julito Wong MD        ROS    Vitals:    11/13/18 1515 11/13/18 1526   BP: (!) 162/104 (!) 149/92   Pulse: 94    Resp: 18    Temp: 98.8 °F (37.1 °C)    TempSrc: Oral    SpO2: 96%    Weight: 219 lb 6 oz (99.5 kg)    Height: 6' 2\" (1.88 m)    PainSc:   8    PainLoc: Elbow       Body mass index is 28.17 kg/m². Physical Exam:     Physical Exam   Constitutional: He appears well-developed and well-nourished. No distress. HENT:   Head: Normocephalic and atraumatic. Right Ear: External ear normal.   Left Ear: External ear normal.   Mouth/Throat: No oropharyngeal exudate. TM's normal bilat. Eyes: Conjunctivae are normal. Pupils are equal, round, and reactive to light. Right eye exhibits no discharge. Left eye exhibits no discharge. No scleral icterus. Neck: Normal range of motion. Neck supple. No tracheal deviation present. No thyromegaly present. Cardiovascular: Normal rate, regular rhythm, normal heart sounds and intact distal pulses. Exam reveals no gallop and no friction rub. No murmur heard.   Pulmonary/Chest: Effort normal and breath sounds normal. No stridor. No respiratory distress. He has no wheezes. He has no rales. Abdominal: Soft. Bowel sounds are normal. He exhibits no distension. There is no tenderness. Musculoskeletal: He exhibits no edema or tenderness. Lymphadenopathy:     He has no cervical adenopathy. Neurological: He is alert. He exhibits normal muscle tone. Coordination normal.   Skin: Skin is warm. No rash noted. He is not diaphoretic. No erythema. No pallor. Psychiatric: He has a normal mood and affect. His behavior is normal. Judgment and thought content normal.       Assessment and Plan:       ICD-10-CM ICD-9-CM    1. Chronic elbow pain, right M25.521 719.42     G89.29 338.29    2. Preop examination Z01.818 V72.84    3. Elevated BP without diagnosis of hypertension R03.0 796.2        3. BP check tomorrow, as reviewed, prior to completing pre-op clearance. He prefers to come tomorrow prior to taking morning meds, to see if BP elevated then. Follow-up Disposition:  Return in about 1 day (around 11/14/2018) for BP follow-up--nurse visit only--prefers 8:00AM tomorrow. lab results and schedule of future lab studies reviewed with patient  reviewed medications and side effects in detail    For additional documentation of information and/or recommendations discussed this visit, please see notes in instructions. Plan and evaluation (above) reviewed with pt at visit  Patient voiced understanding of plan and provided with time to ask/review questions. After Visit Summary (AVS) provided to pt after visit with additional instructions as needed/reviewed.

## 2018-11-13 NOTE — PROGRESS NOTES
RM 17    Patient refused flu vaccine    Chief Complaint   Patient presents with    Pre-op Exam     Patient scheduled to have surgery on right elbow 12/7/18     1. Have you been to the ER, urgent care clinic since your last visit? Hospitalized since your last visit? No    2. Have you seen or consulted any other health care providers outside of the [address] [place] [address] since your last visit? Include any pap smears or colon screening. Yes Reason for visit: 10/23/18 last visit to [facility], elbow pain, effusion of  right pain. 9/12/18, [facility], thyroid problem.  [address] [place] [address], July 2018, back pain      Health Maintenance Due   Topic Date Due    MEDICARE Raul Herrera  06/28/2018     Learning Assessment 11/13/2018   PRIMARY LEARNER Patient   HIGHEST LEVEL OF EDUCATION - PRIMARY LEARNER  -   BARRIERS PRIMARY LEARNER NONE   PRIMARY LANGUAGE ENGLISH   LEARNER PREFERENCE PRIMARY DEMONSTRATION     READING     VIDEOS   ANSWERED BY patient   RELATIONSHIP SELF

## 2018-11-13 NOTE — PATIENT INSTRUCTIONS
Return tomorrow for nurse visit, for BP check only, prior to taking your normal morning medications. If BP improved tomorrow without morning meds, will complete pre-op and fax to ortho as reviewed. If BP elevated on repeat testing tomorrow, will review with ortho and update you with plan once reviewed with orthopedics.

## 2018-11-13 NOTE — LETTER
2018 3:52 PM 
 
Mr. Tri Antonio [address] [address] :  1975 Pre-Operative Note/Addendum: 
 
Past Medical History:  
Diagnosis Date  Balance problems  Bipolar 2 disorder ([address]  Chronic fatigue syndrome  Chronic pain   
 back pain worse  Depression  ED (erectile dysfunction)  Extremity pain  Fibromyalgia  GERD (gastroesophageal reflux disease)  Headache(784.0) since about 23y/o  
 excedrine migraine daily for HA multiple doses (3-4 daily) Neurologist , had an MRI brain   
 HTN (hypertension) 11/3/2010  
 no medication as of 2016  Hypertonicity of bladder  IGT (impair glucose tolerance) 2010  Insomnia  Neck pain  Obesity, Class II, BMI 35-39.9, with comorbidity  Osteoarthritis of back  Osteoarthritis of both knees  Pituitary microadenoma ([address]  PTSD (post-traumatic stress disorder)   
  related  Pure hypercholesterolemia 2010  RLS (restless legs syndrome)  Spine pain Eval with spinal injections with Dr. Verito Aparicio at [facility] 3-5-18.  Urge incontinence Past Surgical History:  
Procedure Laterality Date  HX OTHER SURGICAL  2016  
 pt states had \"lap band\" procedure  [place] LASER SURGERY OF EYE  2002  
 both No Known Allergies Current Outpatient Medications on File Prior to Visit Medication Sig Dispense Refill  aspirin-acetaminophen-caffeine (EXCEDRIN ES) 250-250-65 mg per tablet Take 1 Tab by mouth.  calcium-vitamin D (OS-CRYSTAL 500+D) 500 mg(1,250mg) -200 unit per tablet Take 1 Tab by mouth.  gabapentin (NEURONTIN) 800 mg tablet 800 mg four (4) times daily.  oxybutynin chloride XL (DITROPAN XL) 15 mg CR tablet  traZODone (DESYREL) 150 mg tablet  ibuprofen (MOTRIN) 800 mg tablet TAKE 1 TABLET EVERY 8 HOURS AS NEEDED FOR PAIN. TAKE WITH FOOD.  90 Tab 3  
  prasterone, dhea, (DHEA) 50 mg tab Take  by mouth.  Lisdexamfetamine (VYVANSE) 70 mg capsule Take 70 mg by mouth every morning. Indications: ATTENTION-DEFICIT HYPERACTIVITY DISORDER    
 ascorbic acid, vitamin C, (VITAMIN C) 500 mg tablet Take 2,000 mg by mouth daily.  LACTOBACILLUS ACIDOPHILUS (PROBIOTIC PO) Take  by mouth daily.  cyanocobalamin 1,000 mcg tablet Take 5,000 mcg by mouth daily.  cholecalciferol, VITAMIN D3, (VITAMIN D3) 5,000 unit tab tablet Take 5,000 Units by mouth daily.  traZODone (DESYREL) 300 mg tablet Take 150 mg by mouth nightly.  lurasidone (LATUDA) 60 mg tab tablet Take 30 mg by mouth nightly. Indications: DEPRESSION ASSOCIATED WITH BIPOLAR DISORDER    
 multivitamin (ONE A DAY) tablet Take 1 Tab by mouth daily.  dextroamphetamine-amphetamine (ADDERALL) 30 mg tablet Take 30 mg by mouth every evening.  clonazePAM (KLONOPIN) 2 mg tablet Take 1 Tab by mouth three (3) times daily. (Patient taking differently: Take 2 mg by mouth daily.) 3 Tab 0  
 tadalafil (CIALIS) 20 mg tablet Take 1 Tab by mouth as needed. 90 day supply 18 Tab 3 No current facility-administered medications on file prior to visit. Please let me know if you have other questions.  
 
 
Argelia Hoff MD

## 2018-11-14 ENCOUNTER — CLINICAL SUPPORT (OUTPATIENT)
Dept: INTERNAL MEDICINE CLINIC | Age: 43
End: 2018-11-14

## 2018-11-14 VITALS
HEART RATE: 60 BPM | SYSTOLIC BLOOD PRESSURE: 128 MMHG | DIASTOLIC BLOOD PRESSURE: 86 MMHG | TEMPERATURE: 98.1 F | RESPIRATION RATE: 16 BRPM

## 2018-11-14 DIAGNOSIS — R03.0 ELEVATED BP WITHOUT DIAGNOSIS OF HYPERTENSION: Primary | ICD-10-CM

## 2018-11-14 NOTE — PROGRESS NOTES
Pt returned for BP re-check prior to taking morning MH meds. Blood pressure 128/86, pulse 60. Repeat BP acceptable for surgery. Pre-op form completed with BP from today. Exam completed yesterday. Form copy to be faxed after visit.

## 2018-11-26 ENCOUNTER — OFFICE VISIT (OUTPATIENT)
Dept: URGENT CARE | Age: 43
End: 2018-11-26

## 2018-11-26 VITALS
HEART RATE: 90 BPM | BODY MASS INDEX: 27.72 KG/M2 | HEIGHT: 74 IN | OXYGEN SATURATION: 97 % | WEIGHT: 216 LBS | DIASTOLIC BLOOD PRESSURE: 91 MMHG | TEMPERATURE: 97.8 F | SYSTOLIC BLOOD PRESSURE: 143 MMHG | RESPIRATION RATE: 18 BRPM

## 2018-11-26 DIAGNOSIS — S01.111A LACERATION OF RIGHT EYEBROW, INITIAL ENCOUNTER: Primary | ICD-10-CM

## 2018-11-26 DIAGNOSIS — S05.11XA CONTUSION OF RIGHT ORBIT, INITIAL ENCOUNTER: ICD-10-CM

## 2018-11-26 RX ORDER — ACETAMINOPHEN AND CODEINE PHOSPHATE 300; 30 MG/1; MG/1
1 TABLET ORAL
Qty: 10 TAB | Refills: 0 | Status: SHIPPED | OUTPATIENT
Start: 2018-11-26 | End: 2019-01-31

## 2018-11-26 RX ORDER — BACLOFEN 20 MG/1
20 TABLET ORAL 2 TIMES DAILY
Qty: 15 TAB | Refills: 0 | Status: SHIPPED | OUTPATIENT
Start: 2018-11-26 | End: 2019-01-31

## 2018-11-27 NOTE — PROGRESS NOTES
Laceration This is a new problem. The current episode started less than 1 hour ago. Associated symptoms comments: Laceration on rt eyebrow with contusion . He has tried nothing for the symptoms. Past Medical History:  
Diagnosis Date  Balance problems  Bipolar 2 disorder ([address]  Chronic fatigue syndrome  Chronic pain   
 back pain worse  Depression  ED (erectile dysfunction)  Extremity pain  Fibromyalgia  GERD (gastroesophageal reflux disease)  Headache(784.0) since about 23y/o  
 excedrine migraine daily for HA multiple doses (3-4 daily) Neurologist 2008, had an MRI brain 2007  
 HTN (hypertension) 11/3/2010  
 no medication as of 4/2016  Hypertonicity of bladder  IGT (impair glucose tolerance) 12/2/2010  Insomnia  Neck pain  Obesity, Class II, BMI 35-39.9, with comorbidity  Osteoarthritis of back  Osteoarthritis of both knees  Pituitary microadenoma ([address]  PTSD (post-traumatic stress disorder)   
  related  Pure hypercholesterolemia 12/2/2010  RLS (restless legs syndrome)  Spine pain Eval with spinal injections with Dr. Jacquelin Smith at [facility] 3-5-18.  Urge incontinence Past Surgical History:  
Procedure Laterality Date  HX OTHER SURGICAL  07/26/2016  
 pt states had \"lap band\" procedure  [place] LASER SURGERY OF EYE  4/2002  
 both Family History Problem Relation Age of Onset  Cancer Mother  Diabetes Father  Cancer Other  Diabetes Other Social History Socioeconomic History  Marital status: SINGLE Spouse name: Not on file  Number of children: Not on file  Years of education: Not on file  Highest education level: Not on file Social Needs  Financial resource strain: Not on file  Food insecurity - worry: Not on file  Food insecurity - inability: Not on file  Transportation needs - medical: Not on file  Transportation needs - non-medical: Not on file Occupational History  Not on file Tobacco Use  Smoking status: Never Smoker  Smokeless tobacco: Never Used Substance and Sexual Activity  Alcohol use: No  
  Alcohol/week: 0.0 oz  
  Comment: rarely  Drug use: No  
 Sexual activity: Yes  
  Partners: Female Birth control/protection: Pill Other Topics Concern  Not on file Social History Narrative  Not on file ALLERGIES: Patient has no known allergies. Review of Systems All other systems reviewed and are negative. Vitals:  
 11/26/18 1958 BP: (!) 143/91 Pulse: 90 Resp: 18 Temp: 97.8 °F (36.6 °C) SpO2: 97% Weight: 216 lb (98 kg) Height: 6' 2\" (1.88 m) Physical Exam  
HENT:  
Head: Head is with contusion and with laceration (on rt eyebrow- deep). Nursing note and vitals reviewed. MDM Wound Repair Location details: face (Rt eyebrow) Wound length:2.5 cm or less Anesthesia: local infiltration Anesthesia: 
Local Anesthetic: lidocaine 1% with epinephrine Anesthetic total: 5 mL Skin closure: 5-0 nylon Number of sutures: 6 Technique: simple Approximation: close Dressing: 4x4 and antibiotic ointment Patient tolerance: Patient tolerated the procedure well with no immediate complications My total time at bedside, performing this procedure was 16-30 minutes. ICD-10-CM ICD-9-CM 1. Laceration of right eyebrow, initial encounter S01.111A 873.42 acetaminophen-codeine (TYLENOL-CODEINE #3) 300-30 mg per tablet 2. Contusion of right orbit, initial encounter S05. 11XA 921.2 acetaminophen-codeine (TYLENOL-CODEINE #3) 300-30 mg per tablet Medications Ordered Today Medications  acetaminophen-codeine (TYLENOL-CODEINE #3) 300-30 mg per tablet Sig: Take 1 Tab by mouth every six (6) hours as needed for Pain. Max Daily Amount: 4 Tabs. Dispense:  10 Tab Refill:  0  
 baclofen (LIORESAL) 20 mg tablet Sig: Take 1 Tab by mouth two (2) times a day. Dispense:  15 Tab Refill:  0 No results found for any visits on 11/26/18. The patients condition was discussed with the patient and they understand. The patient is to follow up with primary care doctor. If signs and symptoms become worse the pt is to go to the ER. The patient is to take medications as prescribed.

## 2018-11-27 NOTE — PATIENT INSTRUCTIONS
Contusion: Care Instructions Your Care Instructions Contusion is the medical term for a bruise. It is the result of a direct blow or an impact, such as a fall. Contusions are common sports injuries. Most people think of a bruise as a black-and-blue spot. This happens when small blood vessels get torn and leak blood under the skin. But bones, muscles, and organs can also get bruised. This may damage deep tissues but not cause a bruise you can see. The doctor will do a physical exam to find the location of your contusion. You may also have tests to make sure you do not have a more serious injury, such as a broken bone or nerve damage. These may include X-rays or other imaging tests like a CT scan or MRI. Deep-tissue contusions may cause pain and swelling. But if there is no serious damage, they will often get better in a few weeks with home treatment. The doctor has checked you carefully, but problems can develop later. If you notice any problems or new symptoms, get medical treatment right away. Follow-up care is a key part of your treatment and safety. Be sure to make and go to all appointments, and call your doctor if you are having problems. It's also a good idea to know your test results and keep a list of the medicines you take. How can you care for yourself at home? · Put ice or a cold pack on the sore area for 10 to 20 minutes at a time to stop swelling. Put a thin cloth between the ice pack and your skin. · Be safe with medicines. Read and follow all instructions on the label. ? If the doctor gave you a prescription medicine for pain, take it as prescribed. ? If you are not taking a prescription pain medicine, ask your doctor if you can take an over-the-counter medicine. · If you can, prop up the sore area on pillows as much as possible for the next few days. Try to keep the sore area above the level of your heart. When should you call for help? Call your doctor now or seek immediate medical care if: 
  · Your pain gets worse.  
  · You have new or worse swelling.  
  · You have tingling, weakness, or numbness in the area near the contusion.  
  · The area near the contusion is cold or pale.  
 Watch closely for changes in your health, and be sure to contact your doctor if: 
  · You do not get better as expected. Where can you learn more? Go to http://yuri-olamide.info/. Enter L872 in the search box to learn more about \"Contusion: Care Instructions. \" Current as of: November 20, 2017 Content Version: 11.8 © 1317-6301 [employer]. Care instructions adapted under license by [employer] (which disclaims liability or warranty for this information). If you have questions about a medical condition or this instruction, always ask your healthcare professional. [address] any warranty or liability for your use of this information. Cuts on the Face Closed With Stitches: Care Instructions Your Care Instructions A cut on your face can be on your chin, cheek, nose, forehead, eyelid, lip, or ear. The doctor used stitches to close the cut. Using stitches helps the cut heal and reduces scarring. The doctor may also have called in a specialist, such as a plastic surgeon, to close the cut. If the cut went deep and through the skin, the doctor may have put in two layers of stitches. The deeper layer brings the deep part of the cut together. These stitches will dissolve and don't need to be removed. The stitches in the upper layer are the ones you see on the cut. You will probably have a bandage. You will need to have the stitches removed, usually in 3 to 5 days. The doctor has checked you carefully, but problems can develop later. If you notice any problems or new symptoms, get medical treatment right away. Follow-up care is a key part of your treatment and safety.  Be sure to make and go to all appointments, and call your doctor if you are having problems. It's also a good idea to know your test results and keep a list of the medicines you take. How can you care for yourself at home? · Keep the cut dry for the first 24 to 48 hours. After this, you can shower if your doctor okays it. Pat the cut dry. · Don't soak the cut, such as in a bathtub. Your doctor will tell you when it's safe to get the cut wet. · If your doctor told you how to care for your cut, follow your doctor's instructions. If you did not get instructions, follow this general advice: ? After the first 24 to 48 hours, wash around the cut with clean water 2 times a day. Don't use hydrogen peroxide or alcohol, which can slow healing. ? You may cover the cut with a thin layer of petroleum jelly, such as Vaseline, and a nonstick bandage. ? Apply more petroleum jelly and replace the bandage as needed. · Avoid any activity that could cause your cut to reopen. · Do not remove the stitches on your own. Your doctor will tell you when to come back to have the stitches removed. · Be safe with medicines. Take pain medicines exactly as directed. ? If the doctor gave you a prescription medicine for pain, take it as prescribed. ? If you are not taking a prescription pain medicine, ask your doctor if you can take an over-the-counter medicine. When should you call for help? Call your doctor now or seek immediate medical care if: 
  · You have new pain, or your pain gets worse.  
  · The skin near the cut is cold or pale or changes color.  
  · You have tingling, weakness, or numbness near the cut.  
  · The cut starts to bleed, and blood soaks through the bandage. Oozing small amounts of blood is normal.  
  · You have symptoms of infection, such as: 
? Increased pain, swelling, warmth, or redness around the cut. 
? Red streaks leading from the cut. 
? Pus draining from the cut. 
? A fever.  Watch closely for changes in your health, and be sure to contact your doctor if: 
  · You do not get better as expected. Where can you learn more? Go to http://yuri-olamide.info/. Enter H256 in the search box to learn more about \"Cuts on the Face Closed With Stitches: Care Instructions. \" Current as of: November 20, 2017 Content Version: 11.8 © 0361-6105 [employer]. Care instructions adapted under license by [employer] (which disclaims liability or warranty for this information). If you have questions about a medical condition or this instruction, always ask your healthcare professional. [address] any warranty or liability for your use of this information.

## 2018-12-06 ENCOUNTER — OFFICE VISIT (OUTPATIENT)
Dept: URGENT CARE | Age: 43
End: 2018-12-06

## 2018-12-06 VITALS
BODY MASS INDEX: 27.72 KG/M2 | HEART RATE: 81 BPM | RESPIRATION RATE: 16 BRPM | WEIGHT: 216 LBS | DIASTOLIC BLOOD PRESSURE: 84 MMHG | SYSTOLIC BLOOD PRESSURE: 141 MMHG | OXYGEN SATURATION: 99 % | HEIGHT: 74 IN | TEMPERATURE: 97.3 F

## 2018-12-06 DIAGNOSIS — S01.111D LACERATION OF RIGHT EYEBROW, SUBSEQUENT ENCOUNTER: Primary | ICD-10-CM

## 2018-12-06 DIAGNOSIS — Z48.02 VISIT FOR SUTURE REMOVAL: ICD-10-CM

## 2018-12-06 NOTE — PROGRESS NOTES
Madison presents for suture removal of sutures placed in right eyebrow on 11/26 due to contusion injury while playing basketball. Denies pain, drainage, fever. The history is provided by the patient. Past Medical History:  
Diagnosis Date  Balance problems  Bipolar 2 disorder ([address]  Chronic fatigue syndrome  Chronic pain   
 back pain worse  Depression  ED (erectile dysfunction)  Extremity pain  Fibromyalgia  GERD (gastroesophageal reflux disease)  Headache(784.0) since about 21y/o  
 excedrine migraine daily for HA multiple doses (3-4 daily) Neurologist 2008, had an MRI brain 2007  
 HTN (hypertension) 11/3/2010  
 no medication as of 4/2016  Hypertonicity of bladder  IGT (impair glucose tolerance) 12/2/2010  Insomnia  Neck pain  Obesity, Class II, BMI 35-39.9, with comorbidity  Osteoarthritis of back  Osteoarthritis of both knees  Pituitary microadenoma ([address]  PTSD (post-traumatic stress disorder)   
  related  Pure hypercholesterolemia 12/2/2010  RLS (restless legs syndrome)  Spine pain Eval with spinal injections with Dr. Marek Gilbert at [facility] 3-5-18.  Urge incontinence Past Surgical History:  
Procedure Laterality Date  HX OTHER SURGICAL  07/26/2016  
 pt states had \"lap band\" procedure  [place] LASER SURGERY OF EYE  4/2002  
 both Family History Problem Relation Age of Onset  Cancer Mother  Diabetes Father  Cancer Other  Diabetes Other Social History Socioeconomic History  Marital status: SINGLE Spouse name: Not on file  Number of children: Not on file  Years of education: Not on file  Highest education level: Not on file Social Needs  Financial resource strain: Not on file  Food insecurity - worry: Not on file  Food insecurity - inability: Not on file  Transportation needs - medical: Not on file  Transportation needs - non-medical: Not on file Occupational History  Not on file Tobacco Use  Smoking status: Never Smoker  Smokeless tobacco: Never Used Substance and Sexual Activity  Alcohol use: No  
  Alcohol/week: 0.0 oz  
  Comment: rarely  Drug use: No  
 Sexual activity: Yes  
  Partners: Female Birth control/protection: Pill Other Topics Concern  Not on file Social History Narrative  Not on file ALLERGIES: Patient has no known allergies. Review of Systems Eyes: Negative for visual disturbance. Skin: Positive for color change and wound. Neurological: Negative for dizziness and headaches. Vitals:  
 12/06/18 5011 BP: 141/84 Pulse: 81 Resp: 16 Temp: 97.3 °F (36.3 °C) SpO2: 99% Weight: 216 lb (98 kg) Height: 6' 2\" (1.88 m) Physical Exam  
Constitutional: He appears well-developed and well-nourished. No distress. Neurological: He is alert. Skin: He is not diaphoretic. Right eyebrow: 1.5cm laceration without good granulation formation with 6 C/D/I sutures Psychiatric: He has a normal mood and affect. His behavior is normal. Judgment and thought content normal.  
Nursing note and vitals reviewed. [facility] 
  ICD-10-CM ICD-9-CM 1. Laceration of right eyebrow, subsequent encounter S01.111D V58.89   
  873.42   
2. Visit for suture removal Z48.02 V58.32 Suture/Staple Removal 
Date/Time: 12/6/2018 9:24 AM 
Pre-procedure re-eval: Immediately prior to the procedure, the patient was reevaluated and found suitable for the planned procedure and any planned medications. Performed by: Roshan Wnyne MD 
Authorized by: Roshan Wynne MD  
Patient tolerance: Patient tolerated the procedure well with no immediate complications Body area: head/neck Location details: right eyebrow Wound Appearance: clean Sutures Removed: 6 Facility: sutures placed in this facility My total time at bedside, performing this procedure was 1-15 minutes.

## 2018-12-06 NOTE — PATIENT INSTRUCTIONS

## 2018-12-16 DIAGNOSIS — N52.9 ERECTILE DYSFUNCTION, UNSPECIFIED ERECTILE DYSFUNCTION TYPE: ICD-10-CM

## 2018-12-16 RX ORDER — TADALAFIL 20 MG/1
20 TABLET ORAL AS NEEDED
Qty: 18 TAB | Refills: 3 | Status: SHIPPED | OUTPATIENT
Start: 2018-12-16 | End: 2019-03-12 | Stop reason: SDUPTHER

## 2019-01-07 RX ORDER — IBUPROFEN 800 MG/1
TABLET ORAL
Qty: 90 TAB | Refills: 3 | Status: SHIPPED | OUTPATIENT
Start: 2019-01-07 | End: 2019-06-05

## 2019-01-31 ENCOUNTER — OFFICE VISIT (OUTPATIENT)
Dept: RHEUMATOLOGY | Age: 44
End: 2019-01-31

## 2019-01-31 ENCOUNTER — HOSPITAL ENCOUNTER (OUTPATIENT)
Dept: LAB | Age: 44
Discharge: HOME | End: 2019-01-31
Payer: MEDICARE

## 2019-01-31 VITALS
BODY MASS INDEX: 28.23 KG/M2 | TEMPERATURE: 98.2 F | HEIGHT: 74 IN | DIASTOLIC BLOOD PRESSURE: 100 MMHG | RESPIRATION RATE: 18 BRPM | SYSTOLIC BLOOD PRESSURE: 160 MMHG | WEIGHT: 220 LBS | HEART RATE: 96 BPM

## 2019-01-31 DIAGNOSIS — M22.2X2 PATELLOFEMORAL ARTHRALGIA OF BOTH KNEES: ICD-10-CM

## 2019-01-31 DIAGNOSIS — M89.9 DISORDER OF BONE: ICD-10-CM

## 2019-01-31 DIAGNOSIS — M13.0 POLYARTHRITIS: Primary | ICD-10-CM

## 2019-01-31 DIAGNOSIS — G89.29 CHRONIC BACK PAIN GREATER THAN 3 MONTHS DURATION: ICD-10-CM

## 2019-01-31 DIAGNOSIS — M22.2X1 PATELLOFEMORAL ARTHRALGIA OF BOTH KNEES: ICD-10-CM

## 2019-01-31 DIAGNOSIS — M54.9 CHRONIC BACK PAIN GREATER THAN 3 MONTHS DURATION: ICD-10-CM

## 2019-01-31 DIAGNOSIS — M47.899 OTHER OSTEOARTHRITIS OF SPINE, UNSPECIFIED SPINAL REGION: ICD-10-CM

## 2019-01-31 PROCEDURE — 86140 C-REACTIVE PROTEIN: CPT

## 2019-01-31 PROCEDURE — 82306 VITAMIN D 25 HYDROXY: CPT

## 2019-01-31 PROCEDURE — 80053 COMPREHEN METABOLIC PANEL: CPT

## 2019-01-31 PROCEDURE — 84165 PROTEIN E-PHORESIS SERUM: CPT

## 2019-01-31 PROCEDURE — 85025 COMPLETE CBC W/AUTO DIFF WBC: CPT

## 2019-01-31 PROCEDURE — 86200 CCP ANTIBODY: CPT

## 2019-01-31 PROCEDURE — 85651 RBC SED RATE NONAUTOMATED: CPT

## 2019-01-31 PROCEDURE — 86803 HEPATITIS C AB TEST: CPT

## 2019-01-31 PROCEDURE — 36415 COLL VENOUS BLD VENIPUNCTURE: CPT

## 2019-01-31 PROCEDURE — 86431 RHEUMATOID FACTOR QUANT: CPT

## 2019-01-31 PROCEDURE — 86480 TB TEST CELL IMMUN MEASURE: CPT

## 2019-01-31 RX ORDER — CYCLOBENZAPRINE HCL 10 MG
TABLET ORAL
COMMUNITY
End: 2019-06-05

## 2019-01-31 RX ORDER — PREDNISONE 5 MG/1
TABLET ORAL
Qty: 91 TAB | Refills: 0 | Status: SHIPPED | OUTPATIENT
Start: 2019-01-31 | End: 2019-03-02

## 2019-01-31 NOTE — PROGRESS NOTES
REASON FOR VISIT    This is the initial evaluation for Mr. Isacc Campa a 41 y.o.  male for question of an inflammatory arthritis. The patient is referred to the [facility] at the request of Dr. Ketty Lua. HISTORY OF PRESENT ILLNESS      I have reviewed and summarized old records from [facility] (Lizeth Peña)    In 11/15/2014, MRI Left Ankle without contrast showed Achilles tendon is markedly thickened with intermediate intrasubstance signal and a convex anterior margin, consistent with moderate to severe tendinosis. Signal abnormality extends from the calcaneal insertion to approximately 8 cm proximal to its insertion which corresponds to a marker placed indicating the site of pain. No fiber discontinuity to suggest a focal tear. There is mild edema adjacent to the tendon, compatible with mild peritendinitis. Kager's fat pad is unremarkable. The posterior tibialis tendon is unremarkable. The flexor digitorum longus and flexor hallucis longus are unremarkable. Peroneus longus and brevis tendons are unremarkable. Extensor tendons are normal. There is mild edema just deep to the extensor digitorum longus tendon, nonspecific. Anterior and posterior talofibular as well as the calcaneofibular ligaments: Intact. The syndesmotic ligaments are also intact. Spring and deltoid ligaments:  Intact  Minimal subchondral T2 hyperintensity/cystic changes noted between the posterior and middle facets of the calcaneus. No osteochondral or cartilaginous lesions. There is a trace tibiotalar joint effusion. No abnormal soft tissue fluid collections. Sinus tarsi demonstrates normal signal intensity. Plantar fascia aponeurosis is intact. Small calcaneal plantar heel spur is noted.     In 8/22/2016, Right Ankle radiograph showed no fracture or disruption of the ankle mortise. There is no other acute osseous or articular abnormality. The soft tissues are within normal limits.   Bone mineralization and joints are within normal limits. In 11/09/2017, Cervical Spine radiograph showed the alignment is normal.   The vertebral body heights and disc spaces are well-preserved. There is no fracture or subluxation. The prevertebral soft tissues are normal. The odontoid process is intact and the C1-C2 relationship is normal.   The neural foramina are symmetrical. Lumbar Spine radiograph showed normal alignment. Catheter device projects over the abdomen. The vertebral body heights and disc spaces are well-preserved. There is no fracture, subluxation or other abnormality. In 1/14/2018, MRI Thoracic Spine without contrast showed there is normal alignment of the thoracic spine. Vertebral body heights aremaintained. Marrow signal is normal. Vascular aorta is normal in caliber. There is no pleural effusion. The course, caliber, and signal intensity of the spinal cord are normal. The paraspinal soft tissues are within normal limits. There is no herniation or stenosis. In 8/15/2018, MRI Right Knee without contrast showed no ligament, tendon or meniscal tear is evident.  Small high-grade articular cartilage defects noted in the patellofemoral articulation. TT-TG offset measures 10 mm. Negative for patella abhijit or overt femoral trochlear dysplasia. No high-grade medial or lateral compartment articular cartilage defect is evident. Tiny joint effusion. No Baker's cyst. No concerning bone marrow signal finding.      In 9/26/2018, labs showed creatinine 1.19 mg/dL, eGFR 75, albumin 4.8 g/dL, ALK 82 U/L, ALT 21 U/L, AST 23 U/L    In 10/18/2018, MRI Right Elbow without contrast showed physiologic fluid within the elbow joint.  Bone marrow signals within normal limits without acute fracture or dislocation.  Axial sequence is limited due to lack of fat saturation.  Distal biceps and distal triceps tendons appear intact with normal signal. UCL appears intact with mild increased signal near the humeral attachment which can be seen with sprain, but does not correlate with the reported lateral sided symptoms.  RCL appears intact. No significant muscular or subcutaneous fatty signal abnormality. There is minimal increased T2 signal at the lateral epicondyle near the common extensor tendon attachment consistent with lateral epicondylitis.  The common extensor and flexor tendons appear intact.      In 10/22/2018, Moderate bilateral carpal tunnel syndrome. 2) No evidence of right ulnar neuropathy. No evidence of active cervical radiculopathy. Today, he complains of a 10 year history of multiple physicians due to pain in his knees, feet, back, neck and has done physical therapy and treated with medications. Physical therapy did not help. Aleve, Advil, diclofenac, Tylenol do not help. [place] bomb ointment helps temporarily. Hot water helps loosen him up. He was given a prednisone taper for his elbow which did not help. He follows with orthopedic. He reports being flexible. He reports having left knee and feet swelling. The foot swelling is after walking/jogging and becomes hot and painful, so he tried to ice it when helps a little. He has morning stiffness lasting hours in his knees. He had steroid injections in his left big toe which helped for two months. He plays basket ball with some limitations. Therapy History includes:    Current DMARD therapy includes: none  Prior DMARD therapy includes: none  The following DMARDs have been ineffective: none  The following DMARDs were stopped because of side effects: none    REVIEW OF SYSTEMS    A 15 point review of systems was performed and summarized below. The questionnaire was reviewed with the patient and scanned into the patient's medical record.     General: endorses fatigue, weakness, denies recent weight gain, recent weight loss, fever, night sweats  Musculoskeletal: endorses joint pain, joint swelling, morning stiffness (lasting hours), muscle pain  Ears: denies ringing in ears, loss of hearing, deafness  Eyes: endorses blurred vision, denies pain, redness, loss of vision, double vision, dryness, foreign body sensation  Mouth: endorses loss of taste, dryness, increased dental caries  denies sore tongue, oral ulcers, bleeding gums  Nose: denies nosebleeds, loss of smell, nasal ulcers  Throat: denies frequent sore throats, hoarseness, difficulty in swallowing, pain in jaw while chewing  Neck: denies swollen glands, tender glands  Cardiopulmonary: denies pain in chest, irregular heart beat, sudden changes in heart beat, shortness of breath, difficulty breathing at night, dry cough, productive cough, coughing of blood, wheezing  Gastrointestinal: endorses stomach pain relieved by food, denies nausea, heartburn, vomiting of blood/\"coffee grounds\", jaundice, increasing constipation, persistent diarrhea, blood in stools, black stools  Genitourinary: endorses nocturia, frequent urination, sexual difficulties, denies difficult urination, pain or burning on urination, blood in urine,  rash/ulcers, cloudy urine, pus in urine, genital discharge, prostate trouble  Hematologic: denies anemia, bleeding tendency, blood clots  Skin: endorses easy bruising, denies sun sensitive, rash, redness, hives, skin tightness, nodules/bumps, hair loss, color changes of hands or feet in the cold (Raynaud's)  Neurologic: endorses headaches, muscle weakness, numbness or tingling in hands/feet, denies dizziness, memory loss  Psychiatric: endorses PTSD, Bipolar, denies depression, excessive worries  Sleep: endorses poor sleep (8 hours), difficulty staying asleep , denies snoring, apnea, daytime somnolence, difficulty falling asleep    PAST MEDICAL HISTORY    He has a past medical history of Balance problems, Bipolar 2 disorder (HCC), Chronic fatigue syndrome, Chronic pain, Depression, ED (erectile dysfunction), Extremity pain, Fibromyalgia, GERD (gastroesophageal reflux disease), Headache(784.0) (since about 23y/o), HTN (hypertension) (11/3/2010), Hypertonicity of bladder, IGT (impair glucose tolerance) (12/2/2010), Insomnia, Neck pain, Obesity, Class II, BMI 35-39.9, with comorbidity, Osteoarthritis of back, Osteoarthritis of both knees, Pituitary microadenoma ([facility] [address] PTSD (post-traumatic stress disorder), Pure hypercholesterolemia (12/2/2010), RLS (restless legs syndrome), Spine pain, and Urge incontinence. FAMILY HISTORY    His family history includes Cancer in his mother and another family member; Diabetes in his father and another family member. SOCIAL HISTORY    He reports that  has never smoked. he has never used smokeless tobacco. He reports that he does not drink alcohol or use drugs. HEALTH MAINTENANCE    Immunizations  Immunization History   Administered Date(s) Administered    Tdap 06/27/2017     MEDICATIONS    Current Outpatient Medications   Medication Sig Dispense Refill    cyclobenzaprine (FLEXERIL) 10 mg tablet Take  by mouth three (3) times daily as needed for Muscle Spasm(s).  predniSONE (DELTASONE) 5 mg tablet 4 tabs daily for 7 days, 3 tabs for 7 days, 2 tabs for 14 days, 1 tab for 14 days 91 Tab 0    ibuprofen (MOTRIN) 800 mg tablet TAKE 1 TABLET EVERY 8 HOURS AS NEEDED FOR PAIN. TAKE WITH FOOD. 90 Tab 3    tadalafil (CIALIS) 20 mg tablet Take 1 Tab by mouth as needed. As directed. 18 Tab 3    aspirin-acetaminophen-caffeine (EXCEDRIN ES) 250-250-65 mg per tablet Take 1 Tab by mouth.  calcium-vitamin D (OS-CRYSTAL 500+D) 500 mg(1,250mg) -200 unit per tablet Take 1 Tab by mouth.  gabapentin (NEURONTIN) 800 mg tablet 800 mg four (4) times daily.  oxybutynin chloride XL (DITROPAN XL) 15 mg CR tablet       traZODone (DESYREL) 150 mg tablet nightly.  prasterone, dhea, (DHEA) 50 mg tab Take  by mouth.  Lisdexamfetamine (VYVANSE) 70 mg capsule Take 70 mg by mouth every morning.  Indications: ATTENTION-DEFICIT HYPERACTIVITY DISORDER      ascorbic acid, vitamin C, (VITAMIN C) 500 mg tablet Take 2,000 mg by mouth daily.  LACTOBACILLUS ACIDOPHILUS (PROBIOTIC PO) Take  by mouth daily.  cholecalciferol, VITAMIN D3, (VITAMIN D3) 5,000 unit tab tablet Take 5,000 Units by mouth daily.  lurasidone (LATUDA) 60 mg tab tablet Take 30 mg by mouth nightly. Indications: DEPRESSION ASSOCIATED WITH BIPOLAR DISORDER      multivitamin (ONE A DAY) tablet Take 1 Tab by mouth daily.  dextroamphetamine-amphetamine (ADDERALL) 30 mg tablet Take 30 mg by mouth every evening.  clonazePAM (KLONOPIN) 2 mg tablet Take 1 Tab by mouth three (3) times daily. (Patient taking differently: Take 2 mg by mouth daily.) 3 Tab 0       ALLERGIES    No Known Allergies    PHYSICAL EXAMINATION    Visit Vitals  BP (!) 160/100   Pulse 96   Temp 98.2 °F (36.8 °C)   Resp 18   Ht 6' 2\" (1.88 m)   Wt 220 lb (99.8 kg)   BMI 28.25 kg/m²     Body mass index is 28.25 kg/m². General: Patient is alert, oriented x 3, not in acute distress    HEENT:   Conjunctiva are not injected and appear moist, oral mucous membranes are moist, there are no ulcers present, there is no alopecia, neck is supple, there is no lymphadenopathy. Salivary glands are normal    Cardiovascular:  Heart is regular rate and rhythm, no murmurs. Chest:  Lungs are clear to auscultation bilaterally. Extremities:  Free of clubbing, cyanosis, edema, extremities well perfused. Neurological exam:  No focal sensory deficits, muscle strength is full in upper and lower extremities. Skin exam:  There are no rashes, no tophi, no psoriasis, no active Raynaud's, no livedo reticularis, no periungual erythema. Musculoskeletal exam:  A comprehensive musculoskeletal exam was performed for all joints of each upper and lower extremity and assessed for swelling, tenderness and range of motion. Pertinent results are documented as below:    Bilateral knee crepitus without effusion.   Bilateral Achilles tendon tenderness  Left MTP tenderness    Z-Deformities:   no  [place] Neck Deformities:  no  Boutonierre's Deformities:  no  Ulnar Deviation:   no  MCP Subluxation:  no    Joint Count 1/31/2019   Patient pain (0-100) 85   MHAQ 1.625   Left wrist- Tender 1   Left wrist- Swollen 1   Left 1st MCP - Tender 1   Left 1st MCP - Swollen 1   Left 2nd MCP - Tender 0   Left 2nd MCP - Swollen 1   Left 4th PIP - Tender 1   Left 4th PIP - Swollen 0   Left 5th PIP - Tender 1   Left 5th PIP - Swollen 0   Right 1st MCP - Tender 1   Right 1st MCP - Swollen 1   Right 2nd PIP - Tender 1   Right 2nd PIP - Swollen 1   Right 5th PIP - Tender 1   Right 5th PIP - Swollen 0   Tender Joint Count (Total) 7   Swollen Joint Count (Total) 5   Patient Assessment (0-10) 9.5     DATA REVIEW    Prior medical records were reviewed and are summarized as below:    Laboratory data: summarized in the HPI    Imaging: summarized in the HPI. ASSESSMENT AND PLAN    1) Polyarthralgia. He reports joint pain, swelling, and stiffness suggestive for an inflammatory process. He has not had any relief from NSAIDs. He does not recall having relief form prednisone in the past. MRI of his left ankle in 2014 showed Moderate to severe Achilles tendinopathy without evidence of tear. There is associated mild peritendinitis, mild nonspecific edema deep to the extensor digitorum longus tendon. Trace tibiotalar joint effusion. These findings suggest a spondyloarthritis. He also had 7 tender and 5 swollen peripheral joints. I ordered labs and radiographs. I will start him on a short course of prednisone, called a prednisone taper, with 5 mg tablets. This regimen is to be taken as follows: 4 tabs (20 mg) for 7 days, 3 tabs (15 mg) for 7 days, 2 tabs (10 mg) for 14 days and then 1 tab (5 mg) for 14 days, and then stop. I will have him follow up to discuss. 2) Chronic Back Pain. Imaging was normal. He had no relief from physical therapy. 3) Patellofemoral Arthritis.  He had no relief from physical therapy. MRI of right knee did not suggest an inflammatory process. The patient voiced understanding of the aforementioned assessment and plan. Summary of plan was provided in the After Visit Summary patient instructions. I also provided education about MyChart setup and utility.     TODAY'S ORDERS    Orders Placed This Encounter    QUANTIFERON-TB GOLD PLUS    XR FOOT LT MIN 3 V    XR HAND LT MIN 3 V    XR FOOT RT MIN 3 V    XR HAND RT MIN 3 V    CYCLIC CITRUL PEPTIDE AB, IGG    CBC WITH AUTOMATED DIFF    CHRONIC HEPATITIS PANEL    METABOLIC PANEL, COMPREHENSIVE    C REACTIVE PROTEIN, QT    SED RATE (ESR)    RHEUMATOID FACTOR, QL    PROTEIN ELECTROPHORESIS W/ REFLX JAXON    VITAMIN D, 25 HYDROXY    predniSONE (DELTASONE) 5 mg tablet     Future Appointments   Date Time Provider [facility]   3/7/2019  8:40 AM Lacey West MD [address] [place] [address]   9/11/2019  9:50 AM Luis F Hebert MD RDE [address], [place], [address] [place] [address]    Adult Rheumatology   Rheumatology Ultrasound Certified  Dimas Johnson  A Part of [facility] [address] [place] [address]   Phone [phone]  Fax [phone]

## 2019-01-31 NOTE — PATIENT INSTRUCTIONS
I will start you on a short course of prednisone, called a prednisone taper, with 5 mg tablets.      This regimen is to be taken as follows:      - 4 tablets (20 mg), all at once, daily for 7 days   - 3 tablets (15 mg), all at once, daily for 7 days   - 2 tablets (10 mg), all at once, daily for 14 days   - 1 tablet (5 mg), daily for 14 days   - then STOP

## 2019-02-04 LAB
25(OH)D3+25(OH)D2 SERPL-MCNC: 199.2 NG/ML (ref 30–100)
ALBUMIN SERPL ELPH-MCNC: 4.4 G/DL (ref 2.9–4.4)
ALBUMIN SERPL-MCNC: 5.1 G/DL (ref 3.5–5.5)
ALBUMIN/GLOB SERPL: 1.5 {RATIO} (ref 0.7–1.7)
ALBUMIN/GLOB SERPL: 2.3 {RATIO} (ref 1.2–2.2)
ALP SERPL-CCNC: 93 IU/L (ref 39–117)
ALPHA1 GLOB SERPL ELPH-MCNC: 0.3 G/DL (ref 0–0.4)
ALPHA2 GLOB SERPL ELPH-MCNC: 0.7 G/DL (ref 0.4–1)
ALT SERPL-CCNC: 23 IU/L (ref 0–44)
AST SERPL-CCNC: 22 IU/L (ref 0–40)
B-GLOBULIN SERPL ELPH-MCNC: 1.1 G/DL (ref 0.7–1.3)
BASOPHILS # BLD AUTO: 0 X10E3/UL (ref 0–0.2)
BASOPHILS NFR BLD AUTO: 0 %
BILIRUB SERPL-MCNC: 0.3 MG/DL (ref 0–1.2)
BUN SERPL-MCNC: 23 MG/DL (ref 6–24)
BUN/CREAT SERPL: 22 (ref 9–20)
CALCIUM SERPL-MCNC: 9.7 MG/DL (ref 8.7–10.2)
CCP IGA+IGG SERPL IA-ACNC: 12 UNITS (ref 0–19)
CHLORIDE SERPL-SCNC: 101 MMOL/L (ref 96–106)
CO2 SERPL-SCNC: 25 MMOL/L (ref 20–29)
COMMENT, 144067: NORMAL
CREAT SERPL-MCNC: 1.06 MG/DL (ref 0.76–1.27)
CRP SERPL-MCNC: 4 MG/L (ref 0–4.9)
EOSINOPHIL # BLD AUTO: 0.1 X10E3/UL (ref 0–0.4)
EOSINOPHIL NFR BLD AUTO: 1 %
ERYTHROCYTE [DISTWIDTH] IN BLOOD BY AUTOMATED COUNT: 14.5 % (ref 12.3–15.4)
ERYTHROCYTE [SEDIMENTATION RATE] IN BLOOD BY WESTERGREN METHOD: 2 MM/HR (ref 0–15)
GAMMA GLOB SERPL ELPH-MCNC: 0.9 G/DL (ref 0.4–1.8)
GLOBULIN SER CALC-MCNC: 2.2 G/DL (ref 1.5–4.5)
GLOBULIN SER CALC-MCNC: 2.9 G/DL (ref 2.2–3.9)
GLUCOSE SERPL-MCNC: 96 MG/DL (ref 65–99)
HBV CORE AB SERPL QL IA: NEGATIVE
HBV CORE IGM SERPL QL IA: NEGATIVE
HBV E AB SERPL QL IA: NEGATIVE
HBV E AG SERPL QL IA: NEGATIVE
HBV SURFACE AB SER QL: NON REACTIVE
HBV SURFACE AG SERPL QL IA: NEGATIVE
HCT VFR BLD AUTO: 40.6 % (ref 37.5–51)
HCV AB S/CO SERPL IA: <0.1 S/CO RATIO (ref 0–0.9)
HGB BLD-MCNC: 13.8 G/DL (ref 13–17.7)
IMM GRANULOCYTES # BLD AUTO: 0 X10E3/UL (ref 0–0.1)
IMM GRANULOCYTES NFR BLD AUTO: 0 %
LYMPHOCYTES # BLD AUTO: 1.3 X10E3/UL (ref 0.7–3.1)
LYMPHOCYTES NFR BLD AUTO: 22 %
M PROTEIN SERPL ELPH-MCNC: NORMAL G/DL
MCH RBC QN AUTO: 26.3 PG (ref 26.6–33)
MCHC RBC AUTO-ENTMCNC: 34 G/DL (ref 31.5–35.7)
MCV RBC AUTO: 77 FL (ref 79–97)
MONOCYTES # BLD AUTO: 0.4 X10E3/UL (ref 0.1–0.9)
MONOCYTES NFR BLD AUTO: 6 %
NEUTROPHILS # BLD AUTO: 4.2 X10E3/UL (ref 1.4–7)
NEUTROPHILS NFR BLD AUTO: 71 %
PLATELET # BLD AUTO: 248 X10E3/UL (ref 150–379)
PLEASE NOTE, 011150: NORMAL
POTASSIUM SERPL-SCNC: 4.6 MMOL/L (ref 3.5–5.2)
PROT PATTERN SERPL ELPH-IMP: NORMAL
PROT SERPL-MCNC: 7.3 G/DL (ref 6–8.5)
RBC # BLD AUTO: 5.25 X10E6/UL (ref 4.14–5.8)
RHEUMATOID FACT SERPL-ACNC: 10.6 IU/ML (ref 0–13.9)
SODIUM SERPL-SCNC: 140 MMOL/L (ref 134–144)
WBC # BLD AUTO: 6 X10E3/UL (ref 3.4–10.8)

## 2019-02-05 LAB
GAMMA INTERFERON BACKGROUND BLD IA-ACNC: 0.03 IU/ML
M TB IFN-G BLD-IMP: NEGATIVE
M TB IFN-G CD4+ BCKGRND COR BLD-ACNC: 0.04 IU/ML
MITOGEN IGNF BLD-ACNC: >10 IU/ML
QUANTIFERON INCUBATION, QF1T: NORMAL
QUANTIFERON TB2 AG: 0.04 IU/ML
SERVICE CMNT-IMP: NORMAL

## 2019-02-19 ENCOUNTER — OFFICE VISIT (OUTPATIENT)
Dept: INTERNAL MEDICINE CLINIC | Age: 44
End: 2019-02-19

## 2019-02-19 VITALS
RESPIRATION RATE: 16 BRPM | TEMPERATURE: 98 F | WEIGHT: 220.25 LBS | SYSTOLIC BLOOD PRESSURE: 148 MMHG | DIASTOLIC BLOOD PRESSURE: 91 MMHG | HEART RATE: 70 BPM | HEIGHT: 74 IN | OXYGEN SATURATION: 98 % | BODY MASS INDEX: 28.27 KG/M2

## 2019-02-19 DIAGNOSIS — M21.612 BUNION OF LEFT FOOT: Primary | ICD-10-CM

## 2019-02-19 DIAGNOSIS — Z01.818 PREOP EXAMINATION: ICD-10-CM

## 2019-02-19 NOTE — PROGRESS NOTES
History of Present Illness:  
Jerry Norwood is a 41 y.o. male here for evaluation: Chief Complaint Patient presents with  Pre-op Exam  
  Patient scheduled for left foot surgery on 2/27/19 Pre-operative Evaluation 
  
Date of Exam: 02/19/19 
  
Jrery Norwood is a 41 y.o. male who present for pre-operative evaluation. Procedure/Surgery:  Bunionectomy with metatarsal osteotomy left foot-due to pain/deformity left foot. Date of Procedure/Surgery:  2-27-19 Surgeon: Dr. Latrice Webb and Amy Austin 50, [facility]/Surgical Facility: [facility]--outpt/same day surgery. Primary Physician: Ministerio Snyder MD  
Anesthesia:  MAC. Seen in interim with Dr. Leesa Frias and started steroid taper. He notes podiatry and rheumatology both fine with proceeding while on taper. Had suture removal in ED also since last visit here Nov 2018 for contusion. Last visit here was BP check prior to surgery. He has been cleared to return to full activity and shouldn't have problems as needed for UE weight-bearing after foot surgery. 
 
 
  
Past Medical History:  
Diagnosis Date  Balance problems  Bipolar 2 disorder ([address]  Chronic fatigue syndrome  Chronic pain   
 back pain worse  Depression  ED (erectile dysfunction)  Extremity pain  Fibromyalgia  GERD (gastroesophageal reflux disease)  Headache(784.0) since about 23y/o  
 excedrine migraine daily for HA multiple doses (3-4 daily) Neurologist 2008, had an MRI brain 2007  
 HTN (hypertension) 11/3/2010  
 no medication as of 4/2016  Hypertonicity of bladder  IGT (impair glucose tolerance) 12/2/2010  Insomnia  Neck pain  Obesity, Class II, BMI 35-39.9, with comorbidity  Osteoarthritis of back  Osteoarthritis of both knees  Pituitary microadenoma ([address]  PTSD (post-traumatic stress disorder)   
  related  Pure hypercholesterolemia 12/2/2010  RLS (restless legs syndrome)  Spine pain Eval with spinal injections with Dr. Imelda Powers at [facility] 3-5-18.  Urge incontinence Past Surgical History:  
Procedure Laterality Date  HX OTHER SURGICAL  07/26/2016  
 pt states had \"lap band\" procedure  HX OTHER SURGICAL    
 surgery on right elbow for \"tennis elbow\" that was inflammed  [place] LASER SURGERY OF EYE  4/2002  
 both No Known Allergies Latex Allergy: No 
 
 
No recent illnesses/problems noted by pt. No concerns for surgery noted by pt at this time. No interim illnesses noted. History of Anesthesia Complications: None Family History of Anesthesia Complication:  None History of abnormal bleeding : None Family History of abnormal bleeding:  None History of Blood Transfusions--patient: No    
  
Recent use of aspirin (ASA), NSAIDs, or steroids:  None He plans to stop 5 days prior--he is aware and plans to stop tomorrow. REVIEW OF SYSTEMS: 
A comprehensive review of systems was negative except for that written in the HPI. Nursing screenings reviewed by provider at visit. Prior to Admission medications Medication Sig Start Date End Date Taking? Authorizing Provider  
cyclobenzaprine (FLEXERIL) 10 mg tablet Take  by mouth three (3) times daily as needed for Muscle Spasm(s). Yes Provider, Historical  
predniSONE (DELTASONE) 5 mg tablet 4 tabs daily for 7 days, 3 tabs for 7 days, 2 tabs for 14 days, 1 tab for 14 days 1/31/19 3/2/19 Yes Mag Dukes MD  
ibuprofen (MOTRIN) 800 mg tablet TAKE 1 TABLET EVERY 8 HOURS AS NEEDED FOR PAIN. TAKE WITH FOOD. 1/7/19  Yes Hardeep Frank MD  
aspirin-acetaminophen-caffeine [facility] ES) 932-354-69 mg per tablet Take 1 Tab by mouth. Yes Provider, Historical  
calcium-vitamin D (OS-CRYSTAL 500+D) 500 mg(1,250mg) -200 unit per tablet Take 1 Tab by mouth.    Yes Provider, Historical  
 gabapentin (NEURONTIN) 800 mg tablet 800 mg four (4) times daily. 6/28/18  Yes Provider, Historical  
oxybutynin chloride XL (DITROPAN XL) 15 mg CR tablet  7/13/18  Yes Provider, Historical  
traZODone (DESYREL) 150 mg tablet nightly. 6/15/18  Yes Provider, Historical  
prasterone, dhea, (DHEA) 50 mg tab Take  by mouth. Yes Provider, Historical  
Lisdexamfetamine (VYVANSE) 70 mg capsule Take 70 mg by mouth every morning. Indications: ATTENTION-DEFICIT HYPERACTIVITY DISORDER   Yes Provider, Historical  
ascorbic acid, vitamin C, (VITAMIN C) 500 mg tablet Take 2,000 mg by mouth daily. Yes Provider, Historical  
LACTOBACILLUS ACIDOPHILUS (PROBIOTIC PO) Take  by mouth daily. Yes Provider, Historical  
cholecalciferol, VITAMIN D3, (VITAMIN D3) 5,000 unit tab tablet Take 5,000 Units by mouth daily. Yes Provider, Historical  
lurasidone (LATUDA) 60 mg tab tablet Take 30 mg by mouth nightly. Indications: DEPRESSION ASSOCIATED WITH BIPOLAR DISORDER   Yes Provider, Historical  
multivitamin (ONE A DAY) tablet Take 1 Tab by mouth daily. Yes Provider, Historical  
dextroamphetamine-amphetamine (ADDERALL) 30 mg tablet Take 30 mg by mouth every evening. Yes Provider, Historical  
clonazePAM (KLONOPIN) 2 mg tablet Take 1 Tab by mouth three (3) times daily. Patient taking differently: Take 2 mg by mouth daily. 8/6/14  Yes Elisabet Salas MD  
tadalafil (CIALIS) 20 mg tablet Take 1 Tab by mouth as needed. As directed. 12/16/18   Bertram Smith MD  
  
 
ROS Vitals:  
 02/19/19 0820 BP: (!) 148/91 Pulse: 70 Resp: 16 Temp: 98 °F (36.7 °C) TempSrc: Oral  
SpO2: 98% Weight: 220 lb 4 oz (99.9 kg) Height: 6' 2\" (1.88 m) PainSc:   7 PainLoc: Foot Body mass index is 28.28 kg/m². Physical Exam:  
 
Physical Exam  
Constitutional: He appears well-developed and well-nourished. No distress. HENT:  
Head: Normocephalic and atraumatic. Mouth/Throat: Oropharynx is clear and moist. No oropharyngeal exudate. Eyes: Conjunctivae are normal. Pupils are equal, round, and reactive to light. Right eye exhibits no discharge. Left eye exhibits no discharge. No scleral icterus. Neck: Normal range of motion. Neck supple. No tracheal deviation present. No thyromegaly present. Cardiovascular: Normal rate, regular rhythm, normal heart sounds and intact distal pulses. Exam reveals no gallop and no friction rub. No murmur heard. Pulmonary/Chest: Effort normal and breath sounds normal. No stridor. No respiratory distress. He has no wheezes. He has no rales. Abdominal: Soft. Bowel sounds are normal. He exhibits no distension. There is no tenderness. Musculoskeletal: He exhibits no edema or tenderness. Lymphadenopathy:  
  He has no cervical adenopathy. Neurological: He is alert. He exhibits normal muscle tone. Coordination normal.  
Skin: Skin is warm. No rash noted. He is not diaphoretic. No erythema. No pallor. Psychiatric: He has a normal mood and affect. His behavior is normal. Judgment and thought content normal.  
 
Assessment and Plan: ICD-10-CM ICD-9-CM 1. Bunion of left foot M21.612 727.1 2. Preop examination Z01.818 V72.84 Cleared for surgery. Form completed at visit and faxed after visit. Original to pt at visit. Elev BP monitoring and follow-up reviewed. Follow-up Disposition: 
Return in about 3 months (around 5/19/2019) for Blood Pressure follow-up, referral follow-up. 
reviewed medications and side effects in detail For additional documentation of information and/or recommendations discussed this visit, please see notes in instructions. Plan and evaluation (above) reviewed with pt at visit Patient voiced understanding of plan and provided with time to ask/review questions. After Visit Summary (AVS) provided to pt after visit with additional instructions as needed/reviewed.

## 2019-02-19 NOTE — PROGRESS NOTES
RM 18 Patient not fasting at this time. Chief Complaint Patient presents with  Pre-op Exam  
  Patient scheduled for left foot surgery on 2/27/19 1. Have you been to the ER, urgent care clinic since your last visit? Hospitalized since your last visit? No 
 
2. Have you seen or consulted any other health care providers outside of the [address] [place] [address] since your last visit? Include any pap smears or colon screening. No 
 
Health Maintenance Due Topic Date Due  MEDICARE YEARLY EXAM  06/28/2018 Abuse Screening Questionnaire 2/19/2019 Do you ever feel afraid of your partner? Seretha Alejandra Are you in a relationship with someone who physically or mentally threatens you? Seretha Alejandra Is it safe for you to go home? Y  
 
3 most recent PHQ Screens 2/19/2019 Little interest or pleasure in doing things Not at all Feeling down, depressed, irritable, or hopeless Not at all Total Score PHQ 2 0 Learning Assessment 2/19/2019 PRIMARY LEARNER Patient HIGHEST LEVEL OF EDUCATION - PRIMARY LEARNER  -  
BARRIERS PRIMARY LEARNER NONE  
CO-LEARNER CAREGIVER -  
PRIMARY LANGUAGE ENGLISH  
LEARNER PREFERENCE PRIMARY LISTENING  
  -  
  -  
ANSWERED BY patient RELATIONSHIP SELF

## 2019-02-19 NOTE — LETTER
2019 9:13 AM 
 
Mr. Kelin Lopez [address] [address] :  1975 Pre-Operative Note/Addendum: 
 
Past Medical History:  
Diagnosis Date  Balance problems  Bipolar 2 disorder ([address]  Chronic fatigue syndrome  Chronic pain   
 back pain worse  Depression  ED (erectile dysfunction)  Extremity pain  Fibromyalgia  GERD (gastroesophageal reflux disease)  Headache(784.0) since about 23y/o  
 excedrine migraine daily for HA multiple doses (3-4 daily) Neurologist , had an MRI brain   
 HTN (hypertension) 11/3/2010  
 no medication as of 2016  Hypertonicity of bladder  IGT (impair glucose tolerance) 2010  Insomnia  Neck pain  Obesity, Class II, BMI 35-39.9, with comorbidity  Osteoarthritis of back  Osteoarthritis of both knees  Pituitary microadenoma ([facility] [address]  PTSD (post-traumatic stress disorder)   
  related  Pure hypercholesterolemia 2010  RLS (restless legs syndrome)  Spine pain Eval with spinal injections with Dr. Remedios Sherman at [facility] 3-5-18.  Urge incontinence Past Surgical History:  
Procedure Laterality Date  HX OTHER SURGICAL  2016  
 pt states had \"lap band\" procedure  HX OTHER SURGICAL    
 surgery on right elbow for \"tennis elbow\" that was inflammed  [place] LASER SURGERY OF EYE  2002  
 both No Known Allergies Current Outpatient Medications on File Prior to Visit Medication Sig Dispense Refill  cyclobenzaprine (FLEXERIL) 10 mg tablet Take  by mouth three (3) times daily as needed for Muscle Spasm(s).  predniSONE (DELTASONE) 5 mg tablet 4 tabs daily for 7 days, 3 tabs for 7 days, 2 tabs for 14 days, 1 tab for 14 days 91 Tab 0  ibuprofen (MOTRIN) 800 mg tablet TAKE 1 TABLET EVERY 8 HOURS AS NEEDED FOR PAIN. TAKE WITH FOOD.  90 Tab 3  
 aspirin-acetaminophen-caffeine (EXCEDRIN ES) 250-250-65 mg per tablet Take 1 Tab by mouth.  calcium-vitamin D (OS-CRYSTAL 500+D) 500 mg(1,250mg) -200 unit per tablet Take 1 Tab by mouth.  gabapentin (NEURONTIN) 800 mg tablet 800 mg four (4) times daily.  oxybutynin chloride XL (DITROPAN XL) 15 mg CR tablet  traZODone (DESYREL) 150 mg tablet nightly.  prasterone, dhea, (DHEA) 50 mg tab Take  by mouth.  Lisdexamfetamine (VYVANSE) 70 mg capsule Take 70 mg by mouth every morning. Indications: ATTENTION-DEFICIT HYPERACTIVITY DISORDER    
 ascorbic acid, vitamin C, (VITAMIN C) 500 mg tablet Take 2,000 mg by mouth daily.  LACTOBACILLUS ACIDOPHILUS (PROBIOTIC PO) Take  by mouth daily.  cholecalciferol, VITAMIN D3, (VITAMIN D3) 5,000 unit tab tablet Take 5,000 Units by mouth daily.  lurasidone (LATUDA) 60 mg tab tablet Take 30 mg by mouth nightly. Indications: DEPRESSION ASSOCIATED WITH BIPOLAR DISORDER    
 multivitamin (ONE A DAY) tablet Take 1 Tab by mouth daily.  dextroamphetamine-amphetamine (ADDERALL) 30 mg tablet Take 30 mg by mouth every evening.  clonazePAM (KLONOPIN) 2 mg tablet Take 1 Tab by mouth three (3) times daily. (Patient taking differently: Take 2 mg by mouth daily.) 3 Tab 0  
 tadalafil (CIALIS) 20 mg tablet Take 1 Tab by mouth as needed. As directed. 18 Tab 3 No current facility-administered medications on file prior to visit. Please let me know if you have other questions.  
 
 
Omi Harvey MD

## 2019-02-19 NOTE — PATIENT INSTRUCTIONS
Will fax pre-op form and copy of attached summary letter to podiatry after your visit today. If you need re-faxed, please let us know.

## 2019-03-07 ENCOUNTER — OFFICE VISIT (OUTPATIENT)
Dept: RHEUMATOLOGY | Age: 44
End: 2019-03-07

## 2019-03-07 VITALS
HEIGHT: 74 IN | RESPIRATION RATE: 18 BRPM | WEIGHT: 213 LBS | DIASTOLIC BLOOD PRESSURE: 97 MMHG | TEMPERATURE: 97.9 F | HEART RATE: 78 BPM | BODY MASS INDEX: 27.34 KG/M2 | SYSTOLIC BLOOD PRESSURE: 143 MMHG

## 2019-03-07 DIAGNOSIS — M45.A0 NON-RADIOGRAPHIC AXIAL SPONDYLOARTHRITIS (HCC): Primary | ICD-10-CM

## 2019-03-07 DIAGNOSIS — M22.2X2 PATELLOFEMORAL ARTHRALGIA OF BOTH KNEES: ICD-10-CM

## 2019-03-07 DIAGNOSIS — M22.2X1 PATELLOFEMORAL ARTHRALGIA OF BOTH KNEES: ICD-10-CM

## 2019-03-07 DIAGNOSIS — G89.29 CHRONIC PAIN OF LEFT ANKLE: ICD-10-CM

## 2019-03-07 DIAGNOSIS — M25.572 CHRONIC PAIN OF LEFT ANKLE: ICD-10-CM

## 2019-03-07 RX ORDER — OXYCODONE AND ACETAMINOPHEN 5; 325 MG/1; MG/1
TABLET ORAL
COMMUNITY
End: 2019-06-05

## 2019-03-07 RX ORDER — PREDNISONE 5 MG/1
TABLET ORAL DAILY
COMMUNITY
End: 2019-06-05

## 2019-03-07 NOTE — PROGRESS NOTES
REASON FOR VISIT    This is a follow-up visit for Mr. Chris Saravia for Non-Radiographic Axial Spondylitis. Spondyloarthritis phenotype includes:  Anti-CCP positive: no  Rheumatoid factor positive: no  HLA-B27 positive: pending  Inflammatory Back Pain: no  Erosive disease: no  Sacroiliitis: no  Ankylosis: no  Psoriasis: no  Enthesitis: yes  Dactylitis: no  Nail Pitting: no  Onycholysis: no  Splinter Hemorrhage: no  Extra-articular manifestations include: none  SAPHO: no    Immunosuppression Screening (1/31/2019): Quantiferon TB: negative  PPD:  Not performed  Hepatitis B: negative  Hepatitis C: negative    Therapy History includes:  Current NSAIDs include: ibuprofen  Current DMARD therapy include: None  Prior DMARD therapy include: None  Discontinued DMARDs because of inefficacy: None  Discontinued DMARDs because of side effects: None    Active problems include:    Patient Active Problem List   Diagnosis Code    ED (erectile dysfunction) N52.9    Scoliosis M41.9    Depression with anxiety F41.8    Bipolar 2 disorder ([address] F31.81    ADD (attention deficit disorder) F98.8    Insomnia G47.00    Restrictive airway disease J98.4    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    Midline low back pain without sciatica M54.5    Pain of right lower extremity M79.604    Right-sided thoracic back pain M54.6    Chronic lumbar pain M54.5, G89.29    Advance directive discussed with patient Z71.89    Osteoarthritis M19.90    Osteoarthritis of back M47.815    Osteoarthritis of both knees M17.0    PTSD (post-traumatic stress disorder) F43.10    Spine pain M54.9    Fibromyalgia M79.7    Patellofemoral arthralgia of both knees M22.2X1, M22.2X2    Chronic back pain greater than 3 months duration M54.9, G89.29     HISTORY OF PRESENT ILLNESS    Mr. Chris Saravia returns for a follow-up visit. On his last visit, I suspected an inflammatory arthritis, such as a spondylitis. I ordered labs and imaging.  I also prescribed him a prednisone taper. He had interval surgery of his left 1st MTP on 2/27. He is currently on prednisone 5 mg daily. Today, he feels better. His overall aches and pains have almost resolved. He did not have morning stiffness today. He also has less back pain or stiffness. He is no longer needing ibuprofen as much as before. He has morning stiffness and pain ins his ankles that improves with ambulation. Mr. Gokul Elias has continued his medications for arthritis and reports good tolerance without significant side effects. Last toxicity monitoring by blood work was done on 1/31/2019 and did not reveal any significant adverse effects. Most recent inflammatory markers from 1/31/2019 revealed a ESR 2 mm/hr (previously N/A mm/hr) and CRP 4.0 mg/L (previously N/A mg/L). The patient has not had any interval hospital admissions, infections. REVIEW OF SYSTEMS    A comprehensive review of systems was performed and pertinent results are documented in the HPI, review of systems is otherwise non-contributory. PAST MEDICAL HISTORY    He has a past medical history of Balance problems, Bipolar 2 disorder ([address] Chronic fatigue syndrome, Chronic pain, Depression, ED (erectile dysfunction), Extremity pain, Fibromyalgia, GERD (gastroesophageal reflux disease), Headache(784.0) (since about 21y/o), HTN (hypertension) (11/3/2010), Hypertonicity of bladder, IGT (impair glucose tolerance) (12/2/2010), Insomnia, Neck pain, Obesity, Class II, BMI 35-39.9, with comorbidity, Osteoarthritis of back, Osteoarthritis of both knees, Pituitary microadenoma ([facility] [address] PTSD (post-traumatic stress disorder), Pure hypercholesterolemia (12/2/2010), RLS (restless legs syndrome), Spine pain, and Urge incontinence. FAMILY HISTORY    His family history includes Cancer in his mother and another family member; Diabetes in his father and another family member. SOCIAL HISTORY    He reports that  has never smoked.  he has never used smokeless tobacco. He reports that he does not drink alcohol or use drugs. IMMUNIZATIONS  Immunization History   Administered Date(s) Administered    Tdap 06/27/2017       MEDICATIONS    Current Outpatient Medications   Medication Sig Dispense Refill    oxyCODONE-acetaminophen (PERCOCET) 5-325 mg per tablet Take  by mouth every four (4) hours as needed for Pain.  predniSONE (DELTASONE) 5 mg tablet Take  by mouth daily.  adalimumab (HUMIRA,CF, PEN) 40 mg/0.4 mL pnkt 40 mg by SubCUTAneous route every fourteen (14) days. 2 Kit 11    cyclobenzaprine (FLEXERIL) 10 mg tablet Take  by mouth three (3) times daily as needed for Muscle Spasm(s).  ibuprofen (MOTRIN) 800 mg tablet TAKE 1 TABLET EVERY 8 HOURS AS NEEDED FOR PAIN. TAKE WITH FOOD. 90 Tab 3    tadalafil (CIALIS) 20 mg tablet Take 1 Tab by mouth as needed. As directed. 18 Tab 3    aspirin-acetaminophen-caffeine (EXCEDRIN ES) 250-250-65 mg per tablet Take 1 Tab by mouth every six (6) hours as needed.  calcium-vitamin D (OS-CRYSTAL 500+D) 500 mg(1,250mg) -200 unit per tablet Take 1 Tab by mouth.  gabapentin (NEURONTIN) 800 mg tablet 800 mg three (3) times daily.  oxybutynin chloride XL (DITROPAN XL) 15 mg CR tablet daily.  traZODone (DESYREL) 150 mg tablet nightly.  prasterone, dhea, (DHEA) 50 mg tab Take  by mouth daily.  Lisdexamfetamine (VYVANSE) 70 mg capsule Take 70 mg by mouth every morning. Indications: ATTENTION-DEFICIT HYPERACTIVITY DISORDER      ascorbic acid, vitamin C, (VITAMIN C) 500 mg tablet Take 500 mg by mouth daily.  LACTOBACILLUS ACIDOPHILUS (PROBIOTIC PO) Take  by mouth daily.  cholecalciferol, VITAMIN D3, (VITAMIN D3) 5,000 unit tab tablet Take 5,000 Units by mouth daily.  lurasidone (LATUDA) 60 mg tab tablet Take 30 mg by mouth nightly. Indications: DEPRESSION ASSOCIATED WITH BIPOLAR DISORDER      multivitamin (ONE A DAY) tablet Take 1 Tab by mouth daily.       dextroamphetamine-amphetamine (ADDERALL) 30 mg tablet Take 30 mg by mouth every evening.  clonazePAM (KLONOPIN) 2 mg tablet Take 1 Tab by mouth three (3) times daily. (Patient taking differently: Take 2 mg by mouth daily.) 3 Tab 0        ALLERGIES    No Known Allergies    PHYSICAL EXAMINATION    Visit Vitals  BP (!) 143/97   Pulse 78   Temp 97.9 °F (36.6 °C)   Resp 18   Ht 6' 2\" (1.88 m)   Wt 213 lb (96.6 kg)   BMI 27.35 kg/m²     Body mass index is 27.35 kg/m². General: Patient is alert, oriented x 3, not in acute distress    HEENT:   Sclerae are not injected and appear moist.  There is no alopecia. Chest:  Breathing comfortably at room air    Extremities:  Free of clubbing, cyanosis, edema    Neurological exam:  Muscle strength is full in upper and lower extremities     Skin:    Psoriasis:     no  Nail Pitting:     no  Onycholysis:     no  Splinter Hemorrhage:   no  Palmoplantar pustulosis:   no  Acne fulminans:    no  Acne conglobata:    no  Hidradenitis Suppurativa:   no  Dissecting cellulitis of the scalp:  no  Pilonidal sinus:    no  Erythema nodosum:    no    Musculoskeletal:  A comprehensive musculoskeletal exam was NOT performed for all joints of each upper and lower extremity and assessed for swelling, tenderness and range of motion. Previous Exam    Bilateral knee crepitus without effusion.   Bilateral Achilles tendon tenderness  Left MTP tenderness    Costochondritis:  no   Synovitis:   Yes (See table)  Dactylitis:   no  Enthesitis:   Yes (achilles, lateral epicondylitis)    Joint Count 1/31/2019   Patient pain (0-100) 85   MHAQ 1.625   Left wrist- Tender 1   Left wrist- Swollen 1   Left 1st MCP - Tender 1   Left 1st MCP - Swollen 1   Left 2nd MCP - Tender 0   Left 2nd MCP - Swollen 1   Left 4th PIP - Tender 1   Left 4th PIP - Swollen 0   Left 5th PIP - Tender 1   Left 5th PIP - Swollen 0   Right 1st MCP - Tender 1   Right 1st MCP - Swollen 1   Right 2nd PIP - Tender 1   Right 2nd PIP - Swollen 1   Right 5th PIP - Tender 1   Right 5th PIP - Swollen 0   Tender Joint Count (Total) 7   Swollen Joint Count (Total) 5   Patient Assessment (0-10) 9.5     DATA REVIEW    Laboratory     Recent laboratory results were reviewed, summarized, and discussed with the patient. Imaging    Musculoskeletal Ultrasound    Ultrasound of the left ankle. Indication: Achilles pain (3/07/2019). Using a Team-Matchiq e with 12 Mhz probe, limited views of the Achilles tendon were reviewed. This revealed thickening of hte achilles tendon at the body and at the insertion with loss of fibrillar architecture at the insertion. There was thickening without doppler of the peritenon. Bony irregularity at hte insertion with hyperechoic extension, consistent with an enthesophyte. No doppler was appreciated.  There were no erosions seen. There were no soft tissue masses noted. Impression: Achilles tendinitis     Radiographs    Bilateral Hand 1/31/2019: RIGHT: no fracture or other acute osseous or articular abnormality. The soft tissues are within normal limits. Mineralization is normal. No erosions are seen. There is a corticated subchondral cyst in the lateral aspect of the lunate. LEFT:  no fracture or other acute osseous or articular abnormality. The soft tissues are within normal limits. Mineralization is normal. No erosions are seen. Bilateral Foot 1/31/2019: RIGHT: no fracture or other acute osseous or articular abnormality. The soft tissues are within normal limits. Mineralization is normal. No erosions are seen. There is flattening of the second metatarsal head. Achilles and plantar spurs are seen. LEFT: no fracture or other acute osseous or articular abnormality. The soft tissues are within normal limits. Mineralization is normal. No erosions are seen. There is flattening of the second metatarsal head. Plantar spur is seen.     Cervical Spine 11/09/2017: alignment is normal.   The vertebral body heights and disc spaces are well-preserved.   There is no fracture or subluxation. The prevertebral soft tissues are normal. The odontoid process is intact and the C1-C2 relationship is normal.   The neural foramina are symmetrical. Lumbar Spine radiograph showed normal alignment. Catheter device projects over the abdomen.  The vertebral body heights and disc spaces are well-preserved.  There is no fracture, subluxation or other abnormality. Lumbar spine 11/09/2017: normal alignment. Catheter device projects over the abdomen. The vertebral body heights and disc spaces are well-preserved. There is no fracture, subluxation or other abnormality. Right Ankle 8/22/2016: no fracture or disruption of the ankle mortise. There is no other acute osseous or articular abnormality. The soft tissues are within normal limits.  Bone mineralization and joints are within normal limits. CT Imaging    None    MR Imaging    MRI Right Elbow without contrast 10/18/2018: physiologic fluid within the elbow joint.  Bone marrow signals within normal limits without acute fracture or dislocation. Axial sequence is limited due to lack of fat saturation.  Distal biceps and distal triceps tendons appear intact with normal signal. UCL appears intact with mild increased signal near the humeral attachment which can be seen with sprain, but does not correlate with the reported lateral sided symptoms.  RCL appears intact. No significant muscular or subcutaneous fatty signal abnormality. There is minimal increased T2 signal at the lateral epicondyle near the common extensor tendon attachment consistent with lateral epicondylitis.  The common extensor and flexor tendons appear intact.      MRI Right Knee without contrast 8/15/2018: no ligament, tendon or meniscal tear is evident.  Small high-grade articular cartilage defects noted in the patellofemoral articulation. TT-TG offset measures 10 mm.  Negative for patella abhijit or overt femoral trochlear dysplasia. No high-grade medial or lateral compartment articular cartilage defect is evident. Tiny joint effusion. No Baker's cyst. No concerning bone marrow signal finding.       MRI Thoracic Spine without contrast 1/14/2018: there is normal alignment of the thoracic spine. Vertebral body heights aremaintained. Marrow signal is normal. Vascular aorta is normal in caliber. There is no pleural effusion. The course, caliber, and signal intensity of the spinal cord are normal. The paraspinal soft tissues are within normal limits. There is no herniation or stenosis.     MRI Left Ankle without contrast 11/15/2014:  Achilles tendon is markedly thickened with intermediate intrasubstance signal and a convex anterior margin, consistent with moderate to severe tendinosis. Signal abnormality extends from the calcaneal insertion to approximately 8 cm proximal to its insertion which corresponds to a marker placed indicating the site of pain. No fiber discontinuity to suggest a focal tear. There is mild edema adjacent to the tendon, compatible with mild peritendinitis.  Kager's fat pad is unremarkable. The posterior tibialis tendon is unremarkable. The flexor digitorum longus and flexor hallucis longus are unremarkable. Peroneus longus and brevis tendons are unremarkable.  Extensor tendons are normal. There is mild edema just deep to the extensor digitorum longus tendon, nonspecific. Anterior and posterior talofibular as well as the calcaneofibular ligaments: Intact.  The syndesmotic ligaments are also intact. Spring and deltoid ligaments:  Intact  Minimal subchondral T2 hyperintensity/cystic changes noted between the posterior and middle facets of the calcaneus. No osteochondral or cartilaginous lesions. There is a trace tibiotalar joint effusion.  No abnormal soft tissue fluid collections. Sinus tarsi demonstrates normal signal intensity.  Plantar fascia aponeurosis is intact. Small calcaneal plantar heel spur is noted. DXA     None    EMG/NCS    EMG/NCS 10/22/2018:  Moderate bilateral carpal tunnel syndrome. 2) No evidence of right ulnar neuropathy. No evidence of active cervical radiculopathy. ASSESSMENT AND PLAN    This is a follow-up visit for Mr. Isacc Campa. 1) Non-Radiographic Axial Spondyloarthritis. He reports joint pain, swelling, and stiffness suggestive for an inflammatory process. He has not had any relief from NSAIDs. He does not recall having relief form prednisone in the past. MRI of his left ankle in 2014 showed Moderate to severe Achilles tendinopathy without evidence of tear. There is associated mild peritendinitis, mild nonspecific edema deep to the extensor digitorum longus tendon. Trace tibiotalar joint effusion. These findings suggest a spondyloarthritis. He had great improvement with a prednisone taper with lessening to near resolution of all his pain and stiffness in his joints and back. He is no longer needed ibuprofen as before. I performed an ultrasonnd of his Achilles tendon, which was consistent with tendinitis, however, he has just completed a 6 week prednisone taper. I discussed with him about advancing therapy to a biologic (anti-TNF). We discussed the potential adverse effects, which include infections, such as upper respiratory infections, latent TB reactivation, viral hepatitis activation, and routes of administration (subcutaneous). The patient was informed about the low risk for lymphoma based on at least 5 years of post-market data and the likely inherent relation between chronic autoimmune diseases, such as Rheumatoid Arthritis, and lymphoma. The patient was informed these medications co-pay are subject to the patient's insurance coverage and we will not know until it has been submitted to the insurance company. An order will be submitted today [facility] for Humira CF. 2) Chronic Back Pain. Imaging was normal. He had no relief from physical therapy. He had improved with prednisone. 3) Patellofemoral Arthritis. He had no relief from physical therapy. MRI of right knee did not suggest an inflammatory process. 4) Hypervitaminosis D. His vitamin D level was 199.2. I asked him to stop vitamin D    The patient voiced understanding of the aforementioned assessment and plan. Summary of plan was provided in the After Visit Summary patient instructions.      TODAY'S ORDERS    Orders Placed This Encounter    AMB POC US,EXTREMITY,NONVASCULAR,REAL-TIME IMAGE,LIMITED (08483)    adalimumab (HUMIRA,CF, PEN) 40 mg/0.4 mL pnkt     Future Appointments   Date Time Provider Emily Cristiana   6/5/2019  4:00 PM MD Marina Gomez   9/11/2019  9:50 AM Caroline Siddiqui MD RDE [address], [place], [address] [place] [address]    Adult Rheumatology   Rheumatology Ultrasound Certified  [id number]  [facility], [address] [place] [address]   Phone [phone]  Fax [phone]

## 2019-03-07 NOTE — PROGRESS NOTES
Chief Complaint   Patient presents with    Arthritis     1. Have you been to the ER, urgent care clinic since your last visit? Hospitalized since your last visit? No    2. Have you seen or consulted any other health care providers outside of the [address] [place] [address] since your last visit? Include any pap smears or colon screening. Yes When: February 2019 Reason for visit: Surgery on left great toe. Bunionectomy w/ metatarsal osteotomy.

## 2019-03-07 NOTE — PATIENT INSTRUCTIONS
Adalimumab (By injection)   Adalimumab ([id number]-IAN-[id number]-[id number])  Treats arthritis, plaque psoriasis, ankylosing spondylitis, Crohn disease, ulcerative colitis, hidradenitis suppurativa, and uveitis. Brand Name(s): Humira   There may be other brand names for this medicine. When This Medicine Should Not Be Used: This medicine is not right for everyone. Do not use it if you had an allergic reaction to adalimumab. How to Use This Medicine:   Injectable  · Your doctor will prescribe your exact dose and tell you how often it should be given. This medicine is given as a shot under your skin. · A nurse or other health provider will give you this medicine. · You may be taught how to give your medicine at home. Make sure you understand all instructions before giving yourself an injection. Do not use more medicine or use it more often than your doctor tells you to. · You will be shown the body areas where this shot can be given. Use a different body area each time you give yourself a shot. Keep track of where you give each shot to make sure you rotate body areas. Do not inject into skin areas that are red, bruised, tender, or hard. If you have psoriasis, do not inject into a raised, thick, red, or scaly skin patch or into skin lesions. · This medicine should come with a Medication Guide. Ask your pharmacist for a copy if you do not have one. · Missed dose: Take a dose as soon as you remember. If it is almost time for your next dose, wait until then and take a regular dose. Do not take extra medicine to make up for a missed dose. · If you store this medicine at home, keep it in the refrigerator. Do not freeze. Protect the medicine from light. Keep your medicine and supplies in the original packages until you are ready to use them. Drugs and Foods to Avoid:   Ask your doctor or pharmacist before using any other medicine, including over-the-counter medicines, vitamins, and herbal products.   · Some foods and medicines can affect how adalimumab works. Tell your doctor if you are using any of the following:   ¨ Abatacept, anakinra, azathioprine, cyclosporine, mercaptopurine, rituximab, theophylline  ¨ A blood thinner (including warfarin)  ¨ Medicine that weakens the immune system (including a steroid or cancer medicine)  · This medicine may interfere with vaccines. Ask your doctor before you get a flu shot or any other vaccines. Warnings While Using This Medicine:   · Tell your doctor if you are pregnant or breastfeeding, or if you have liver disease, a history of cancer, COPD, heart failure, diabetes, psoriasis, multiple sclerosis, optic neuritis, problems with your immune system, or a history of Guillain-Barré syndrome. Tell your doctor if you have any type of infection (such as hepatitis B or tuberculosis) or an infection that keeps coming back. · This medicine may cause the following problems:   ¨ Increased risk for infection  ¨ Increased risk of certain cancers, such as lymphoma or leukemia  ¨ New or worsening heart failure  · Tell your doctor if you have a latex allergy. The needle cover of the syringe contains latex and may cause allergic reactions. · You will need to have a skin test for tuberculosis (TB) before you start this medicine. Tell your doctor if you or anyone in your home has ever had a positive TB skin test or been exposed to TB. · This medicine may make you bleed, bruise, or get infections more easily. Take precautions to prevent illness and injury. Wash your hands often. · Your doctor will do lab tests at regular visits to check on the effects of this medicine. Keep all appointments. · Throw away used needles in a hard, closed container that the needles cannot poke through. Keep this container away from children and pets. · Keep all medicine out of the reach of children. Never share your medicine with anyone.   Possible Side Effects While Using This Medicine:   Call your doctor right away if you notice any of these side effects:  · Allergic reaction: Itching or hives, swelling in your face or hands, swelling or tingling in your mouth or throat, chest tightness, trouble breathing  · Blistering, peeling, red skin rash, or red, scaly patches on the skin  · Change in how much or how often you urinate, painful urination  · Changes in vision  · Chest pain, uneven heartbeat, trouble breathing  · Cough, fever, chills, runny or stuffy nose, sore throat, and body aches  · Dark urine or pale stools, nausea, vomiting, loss of appetite, stomach pain, yellow skin or eyes  · Numbness, tingling, or burning pain in your hands, arms, legs, or feet, or joint pain  · Rapid weight gain, swelling in your hands, ankles, lower legs, or feet  · Sores or white patches on your lips, mouth, or throat  · Swollen glands in your neck, underarms, or groin  · Unusual bleeding, bruising, tiredness, weakness, or weight loss  If you notice these less serious side effects, talk with your doctor:   · Back pain  · Headache  · Redness, itching, bruising, bleeding, pain, or swelling where the shot was given  If you notice other side effects that you think are caused by this medicine, tell your doctor. Call your doctor for medical advice about side effects. You may report side effects to FDA at 3-666-FDA-4292  © 2017 [facility] Information is for End User's use only and may not be sold, redistributed or otherwise used for commercial purposes. The above information is an  only. It is not intended as medical advice for individual conditions or treatments. Talk to your doctor, nurse or pharmacist before following any medical regimen to see if it is safe and effective for you.

## 2019-03-11 ENCOUNTER — DOCUMENTATION ONLY (OUTPATIENT)
Dept: RHEUMATOLOGY | Age: 44
End: 2019-03-11

## 2019-03-12 DIAGNOSIS — M45.A0 NON-RADIOGRAPHIC AXIAL SPONDYLOARTHRITIS (HCC): ICD-10-CM

## 2019-03-12 DIAGNOSIS — N52.9 ERECTILE DYSFUNCTION, UNSPECIFIED ERECTILE DYSFUNCTION TYPE: ICD-10-CM

## 2019-03-18 RX ORDER — TADALAFIL 20 MG/1
20 TABLET ORAL AS NEEDED
Qty: 18 TAB | Refills: 3 | Status: SHIPPED | OUTPATIENT
Start: 2019-03-18 | End: 2020-04-29

## 2019-04-05 ENCOUNTER — DOCUMENTATION ONLY (OUTPATIENT)
Dept: RHEUMATOLOGY | Age: 44
End: 2019-04-05

## 2019-06-05 ENCOUNTER — OFFICE VISIT (OUTPATIENT)
Dept: RHEUMATOLOGY | Age: 44
End: 2019-06-05

## 2019-06-05 VITALS
WEIGHT: 208 LBS | TEMPERATURE: 98.8 F | HEIGHT: 74 IN | DIASTOLIC BLOOD PRESSURE: 94 MMHG | BODY MASS INDEX: 26.69 KG/M2 | SYSTOLIC BLOOD PRESSURE: 142 MMHG | RESPIRATION RATE: 19 BRPM | HEART RATE: 120 BPM

## 2019-06-05 DIAGNOSIS — M54.6 CHRONIC THORACIC SPINE PAIN: ICD-10-CM

## 2019-06-05 DIAGNOSIS — M22.2X1 PATELLOFEMORAL ARTHRALGIA OF BOTH KNEES: ICD-10-CM

## 2019-06-05 DIAGNOSIS — M54.9 CHRONIC BACK PAIN GREATER THAN 3 MONTHS DURATION: ICD-10-CM

## 2019-06-05 DIAGNOSIS — Z86.69 HISTORY OF BLURRED VISION: ICD-10-CM

## 2019-06-05 DIAGNOSIS — G89.29 CHRONIC THORACIC SPINE PAIN: ICD-10-CM

## 2019-06-05 DIAGNOSIS — M22.2X2 PATELLOFEMORAL ARTHRALGIA OF BOTH KNEES: ICD-10-CM

## 2019-06-05 DIAGNOSIS — M45.A0 NON-RADIOGRAPHIC AXIAL SPONDYLOARTHRITIS (HCC): Primary | ICD-10-CM

## 2019-06-05 DIAGNOSIS — G89.29 CHRONIC BACK PAIN GREATER THAN 3 MONTHS DURATION: ICD-10-CM

## 2019-06-05 DIAGNOSIS — Z79.60 LONG-TERM USE OF IMMUNOSUPPRESSANT MEDICATION: ICD-10-CM

## 2019-06-05 NOTE — PROGRESS NOTES
Chief Complaint   Patient presents with    Joint Pain     1. Have you been to the ER, urgent care clinic since your last visit? Hospitalized since your last visit? No    2. Have you seen or consulted any other health care providers outside of the [address] [place] [address] since your last visit? Include any pap smears or colon screening.  No

## 2019-06-05 NOTE — LETTER
6/5/19 Patient: Albaro Henson YOB: 1975 Date of Visit: 6/5/2019 Shiv Porter MD 
[address] [place] [id number] VIA In Basket Dear Shiv Porter MD, Thank you for referring Mr. Albaro Henson to [address] [place] [address] for evaluation. My notes for this consultation are attached. If you have questions, please do not hesitate to call me. I look forward to following your patient along with you.  
 
 
Sincerely, 
 
Leslie Barbosa MD

## 2019-06-05 NOTE — PROGRESS NOTES
REASON FOR VISIT    This is a follow-up visit for Mr. Maykel Franco for Non-Radiographic Axial Spondylitis. Spondyloarthritis phenotype includes:  Anti-CCP positive: no  Rheumatoid factor positive: no  HLA-B27 positive: pending  Inflammatory Back Pain: no  Erosive disease: no  Sacroiliitis: no  Ankylosis: no  Psoriasis: no  Enthesitis: yes  Dactylitis: no  Nail Pitting: no  Onycholysis: no  Splinter Hemorrhage: no  Extra-articular manifestations include: none  SAPHO: no    Immunosuppression Screening (1/31/2019):   Quantiferon TB: negative  PPD:  Not performed  Hepatitis B: negative  Hepatitis C: negative    Therapy History includes:  Current NSAIDs include: ibuprofen  Current DMARD therapy include: Humira 40 mg every 14 days (3/2019 to present)  Prior DMARD therapy include: None  Discontinued DMARDs because of inefficacy: None  Discontinued DMARDs because of side effects: None    Active problems include:    Patient Active Problem List   Diagnosis Code    ED (erectile dysfunction) N52.9    Scoliosis M41.9    Depression with anxiety F41.8    Bipolar 2 disorder ([address] F31.81    ADD (attention deficit disorder) F98.8    Insomnia G47.00    Restrictive airway disease J98.4    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    Midline low back pain without sciatica M54.5    Pain of right lower extremity M79.604    Right-sided thoracic back pain M54.6    Chronic lumbar pain M54.5, G89.29    Advance directive discussed with patient Z71.89    Osteoarthritis M19.90    Osteoarthritis of back M47.815    Osteoarthritis of both knees M17.0    PTSD (post-traumatic stress disorder) F43.10    Spine pain M54.9    Fibromyalgia M79.7    Patellofemoral arthralgia of both knees M22.2X1, M22.2X2    Chronic back pain greater than 3 months duration M54.9, G89.29    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 Maykel Franco returns for a follow-up visit. On his last visit, I started him on Humira 40 mg every 14 days with good tolerance. He has been on it since 3/2019. Today, he feels good. He feels 80% better. He no longer has Achilles pain, shoulders, elbows, knee or hand pain, joint stiffness. He continues to have thoracic and lumbar back pain and stiffness that is mostly in the evening when he is sitting down for dinner or watching TV. In the morning, he has no back pain or stiffness nor during the evening. He does not wake up from pain. He has not been needing NSAIDS. He is back to working out. He endorses chronic intermittent blurred vision. He reports intermittent photosensitivity. He denies xerophthalmia. He also notes easy bruising, increased thirst.     He denies fever, weight loss, vision loss, oral ulcers, ankle swelling, dry cough, dyspnea, nausea, vomiting, dysphagia, abdominal pain, black or bloody stool, fall since last visit and rash. Mr. Gege Stevens has continued his medications for arthritis and reports good tolerance without significant side effects. Last toxicity monitoring by blood work was done on 1/31/2019 and did not reveal any significant adverse effects. Most recent inflammatory markers from 1/31/2019 revealed a ESR 2 mm/hr (previously N/A mm/hr) and CRP 4.0 mg/L (previously N/A mg/L). The patient has not had any interval hospital admissions, surgeries, or infections. REVIEW OF SYSTEMS    A comprehensive review of systems was performed and pertinent results are documented in the HPI, review of systems is otherwise non-contributory.     PAST MEDICAL HISTORY    He has a past medical history of Balance problems, Bipolar 2 disorder ([facility] [address] Chronic fatigue syndrome, Chronic pain, Depression, ED (erectile dysfunction), Extremity pain, Fibromyalgia, GERD (gastroesophageal reflux disease), Headache(784.0) (since about 23y/o), HTN (hypertension) (11/3/2010), Hypertonicity of bladder, IGT (impair glucose tolerance) (12/2/2010), Insomnia, Neck pain, Obesity, Class II, BMI 35-39.9, with comorbidity, Osteoarthritis of back, Osteoarthritis of both knees, Pituitary microadenoma ([address] PTSD (post-traumatic stress disorder), Pure hypercholesterolemia (12/2/2010), RLS (restless legs syndrome), Spine pain, and Urge incontinence. FAMILY HISTORY    His family history includes Cancer in his mother and another family member; Diabetes in his father and another family member. SOCIAL HISTORY    He reports that he has never smoked. He has never used smokeless tobacco. He reports that he does not drink alcohol or use drugs. IMMUNIZATIONS  Immunization History   Administered Date(s) Administered    Tdap 06/27/2017       MEDICATIONS    Current Outpatient Medications   Medication Sig Dispense Refill    tadalafil (CIALIS) 20 mg tablet Take 1 Tab by mouth as needed for Other (as directed). As directed. 18 Tab 3    adalimumab (HUMIRA,CF, PEN) 40 mg/0.4 mL pnkt 40 mg by SubCUTAneous route every fourteen (14) days. 3 Kit 11    aspirin-acetaminophen-caffeine (EXCEDRIN ES) 250-250-65 mg per tablet Take 1 Tab by mouth every six (6) hours as needed.  calcium-vitamin D (OS-CRYSTAL 500+D) 500 mg(1,250mg) -200 unit per tablet Take 1 Tab by mouth.  gabapentin (NEURONTIN) 800 mg tablet 800 mg three (3) times daily.  oxybutynin chloride XL (DITROPAN XL) 15 mg CR tablet daily.  traZODone (DESYREL) 150 mg tablet nightly.  prasterone, dhea, (DHEA) 50 mg tab Take  by mouth daily.  Lisdexamfetamine (VYVANSE) 70 mg capsule Take 70 mg by mouth every morning. Indications: ATTENTION-DEFICIT HYPERACTIVITY DISORDER      ascorbic acid, vitamin C, (VITAMIN C) 500 mg tablet Take 500 mg by mouth daily.  LACTOBACILLUS ACIDOPHILUS (PROBIOTIC PO) Take  by mouth daily.  cholecalciferol, VITAMIN D3, (VITAMIN D3) 5,000 unit tab tablet Take 5,000 Units by mouth daily.       lurasidone (LATUDA) 60 mg tab tablet Take 30 mg by mouth nightly. Indications: DEPRESSION ASSOCIATED WITH BIPOLAR DISORDER      multivitamin (ONE A DAY) tablet Take 1 Tab by mouth daily.  dextroamphetamine-amphetamine (ADDERALL) 30 mg tablet Take 30 mg by mouth every evening.  clonazePAM (KLONOPIN) 2 mg tablet Take 1 Tab by mouth three (3) times daily. (Patient taking differently: Take 2 mg by mouth daily.) 3 Tab 0        ALLERGIES    No Known Allergies    PHYSICAL EXAMINATION    Visit Vitals  BP (!) 142/94   Pulse (!) 120   Temp 98.8 °F (37.1 °C)   Resp 19   Ht 6' 2\" (1.88 m)   Wt 208 lb (94.3 kg)   BMI 26.71 kg/m²     Body mass index is 26.71 kg/m². General: Patient is alert, oriented x 3, not in acute distress    HEENT:   Sclerae are not injected and appear moist.  There is no alopecia. Chest:  Lungs are clear to auscultation bilaterally. Extremities:  Free of clubbing, cyanosis, edema, extremities well perfused. Extremities:  Free of clubbing, cyanosis, edema    Neurological exam:  Muscle strength is full in upper and lower extremities     Skin:    Psoriasis:     no  Nail Pitting:     no  Onycholysis:     no  Splinter Hemorrhage:   no  Palmoplantar pustulosis:   no  Acne fulminans:    no  Acne conglobata:    no  Hidradenitis Suppurativa:   no  Dissecting cellulitis of the scalp:  no  Pilonidal sinus:    no  Erythema nodosum:    no    Musculoskeletal:  A comprehensive musculoskeletal exam was performed for all joints of each upper and lower extremity and assessed for swelling, tenderness and range of motion. Bilateral knee crepitus without effusion.   Bilateral Achilles tendon tenderness (RESOLVED)  Left MTP tenderness (RESOLVED)    Costochondritis:  no   Synovitis:   Yes (See table)  Dactylitis:   no  Enthesitis:   No (achilles, lateral epicondylitis; RESOLVED)    Joint Count 1/31/2019   Patient pain (0-100) 85   MHAQ 1.625   Left wrist- Tender 1   Left wrist- Swollen 1   Left 1st MCP - Tender 1   Left 1st MCP - Swollen 1   Left 2nd MCP - Tender 0   Left 2nd MCP - Swollen 1   Left 4th PIP - Tender 1   Left 4th PIP - Swollen 0   Left 5th PIP - Tender 1   Left 5th PIP - Swollen 0   Right 1st MCP - Tender 1   Right 1st MCP - Swollen 1   Right 2nd PIP - Tender 1   Right 2nd PIP - Swollen 1   Right 5th PIP - Tender 1   Right 5th PIP - Swollen 0   Tender Joint Count (Total) 7   Swollen Joint Count (Total) 5   Patient Assessment (0-10) 9.5     DATA REVIEW    Laboratory     Recent laboratory results were reviewed, summarized, and discussed with the patient. Imaging    Musculoskeletal Ultrasound    Ultrasound of the left ankle. Indication: Achilles pain (3/07/2019). Using a Frenzoo e with 12 Mhz probe, limited views of the Achilles tendon were reviewed. This revealed thickening of hte achilles tendon at the body and at the insertion with loss of fibrillar architecture at the insertion. There was thickening without doppler of the peritenon. Bony irregularity at hte insertion with hyperechoic extension, consistent with an enthesophyte. No doppler was appreciated.  There were no erosions seen. There were no soft tissue masses noted. Impression: Achilles tendinitis     Radiographs    Bilateral Hand 1/31/2019: RIGHT: no fracture or other acute osseous or articular abnormality. The soft tissues are within normal limits. Mineralization is normal. No erosions are seen. There is a corticated subchondral cyst in the lateral aspect of the lunate. LEFT:  no fracture or other acute osseous or articular abnormality. The soft tissues are within normal limits. Mineralization is normal. No erosions are seen. Bilateral Foot 1/31/2019: RIGHT: no fracture or other acute osseous or articular abnormality. The soft tissues are within normal limits. Mineralization is normal. No erosions are seen. There is flattening of the second metatarsal head. Achilles and plantar spurs are seen. LEFT: no fracture or other acute osseous or articular abnormality.  The soft tissues are within normal limits. Mineralization is normal. No erosions are seen. There is flattening of the second metatarsal head. Plantar spur is seen. Cervical Spine 11/09/2017: alignment is normal.   The vertebral body heights and disc spaces are well-preserved.   There is no fracture or subluxation. The prevertebral soft tissues are normal. The odontoid process is intact and the C1-C2 relationship is normal.   The neural foramina are symmetrical. Lumbar Spine radiograph showed normal alignment. Catheter device projects over the abdomen.  The vertebral body heights and disc spaces are well-preserved.  There is no fracture, subluxation or other abnormality. Lumbar spine 11/09/2017: normal alignment. Catheter device projects over the abdomen. The vertebral body heights and disc spaces are well-preserved. There is no fracture, subluxation or other abnormality. Right Ankle 8/22/2016: no fracture or disruption of the ankle mortise. There is no other acute osseous or articular abnormality. The soft tissues are within normal limits.  Bone mineralization and joints are within normal limits. CT Imaging    None    MR Imaging    MRI Right Elbow without contrast 10/18/2018: physiologic fluid within the elbow joint.  Bone marrow signals within normal limits without acute fracture or dislocation. Axial sequence is limited due to lack of fat saturation.  Distal biceps and distal triceps tendons appear intact with normal signal. UCL appears intact with mild increased signal near the humeral attachment which can be seen with sprain, but does not correlate with the reported lateral sided symptoms.  RCL appears intact. No significant muscular or subcutaneous fatty signal abnormality.  There is minimal increased T2 signal at the lateral epicondyle near the common extensor tendon attachment consistent with lateral epicondylitis.  The common extensor and flexor tendons appear intact.      MRI Right Knee without contrast 8/15/2018: no ligament, tendon or meniscal tear is evident.  Small high-grade articular cartilage defects noted in the patellofemoral articulation. TT-TG offset measures 10 mm. Negative for patella abhijit or overt femoral trochlear dysplasia. No high-grade medial or lateral compartment articular cartilage defect is evident. Tiny joint effusion. No Baker's cyst. No concerning bone marrow signal finding.       MRI Thoracic Spine without contrast 1/14/2018: there is normal alignment of the thoracic spine. Vertebral body heights aremaintained. Marrow signal is normal. Vascular aorta is normal in caliber. There is no pleural effusion. The course, caliber, and signal intensity of the spinal cord are normal. The paraspinal soft tissues are within normal limits. There is no herniation or stenosis.     MRI Left Ankle without contrast 11/15/2014:  Achilles tendon is markedly thickened with intermediate intrasubstance signal and a convex anterior margin, consistent with moderate to severe tendinosis. Signal abnormality extends from the calcaneal insertion to approximately 8 cm proximal to its insertion which corresponds to a marker placed indicating the site of pain. No fiber discontinuity to suggest a focal tear. There is mild edema adjacent to the tendon, compatible with mild peritendinitis.  Kager's fat pad is unremarkable. The posterior tibialis tendon is unremarkable. The flexor digitorum longus and flexor hallucis longus are unremarkable. Peroneus longus and brevis tendons are unremarkable.  Extensor tendons are normal. There is mild edema just deep to the extensor digitorum longus tendon, nonspecific. Anterior and posterior talofibular as well as the calcaneofibular ligaments: Intact.  The syndesmotic ligaments are also intact. Spring and deltoid ligaments:  Intact  Minimal subchondral T2 hyperintensity/cystic changes noted between the posterior and middle facets of the calcaneus. No osteochondral or cartilaginous lesions.  There is a trace tibiotalar joint effusion.  No abnormal soft tissue fluid collections. Sinus tarsi demonstrates normal signal intensity.  Plantar fascia aponeurosis is intact. Small calcaneal plantar heel spur is noted. DXA     None    EMG/NCS    EMG/NCS 10/22/2018: Moderate bilateral carpal tunnel syndrome. 2) No evidence of right ulnar neuropathy. No evidence of active cervical radiculopathy. ASSESSMENT AND PLAN    This is a follow-up visit for Mr. Maykel Franco. 1) Non-Radiographic Axial Spondyloarthritis. He reports a history of joint pain, swelling, and stiffness suggestive for an inflammatory process. He has not had any relief from NSAIDs. He does not recall having relief form prednisone in the past. MRI of his left ankle in 2014 showed Moderate to severe Achilles tendinopathy without evidence of tear. There is associated mild peritendinitis, mild nonspecific edema deep to the extensor digitorum longus tendon. Trace tibiotalar joint effusion. These findings suggest a spondyloarthritis. He had great improvement with a prednisone taper with lessening to near resolution of all his pain and stiffness in his joints and back. He is no longer needed ibuprofen as before. I performed an ultrasound of his Achilles tendon, which was consistent with tendinitis, after completing a 6 week prednisone taper. He has been on Humira every 14 days since 3/2019 with good tolerance and resolution of all his inflammatory symptoms. He is back to working out and has not tender joints or enthesitis. He is moving to [place] and requested a referral for rheumatology, which I gave him. 2) Chronic Back Pain. He complains of thoracic and lumbar spine pain in the evening while eating dinner or watching TV but not in the morning or night, suggestive of mechanical back pain and not inflammatory. I therefore referred him to physical therapy. 3) Patellofemoral Arthritis. He had no relief from physical therapy.  MRI of right knee did not suggest an inflammatory process. 4) Hypervitaminosis D. His vitamin D level was 199.2. I had asked him to stop vitamin D    5) Chronic Intermittent Blurred Vision. He reports intermittent photosensitivity. He denies xerophthalmia. The patient voiced understanding of the aforementioned assessment and plan. Summary of plan was provided in the After Visit Summary patient instructions.      TODAY'S ORDERS    Orders Placed This Encounter    REFERRAL TO PHYSICAL THERAPY    REFERRAL TO RHEUMATOLOGY     Future Appointments   Date Time Provider Department Center   9/11/2019  9:50 AM Era Manrique MD RDE [address] [place], [address] [place] [address]    Adult Rheumatology   Rheumatology Ultrasound Certified  [address]  [facility], [address] [facility]   Phone [phone]  Fax [phone]

## 2019-06-19 ENCOUNTER — DOCUMENTATION ONLY (OUTPATIENT)
Dept: SURGERY | Age: 44
End: 2019-06-19

## 2019-06-19 NOTE — LETTER
[facility] [place] [facility] 
 
 
Dear Patient, Your health is our main concern. It is important for your health to have follow-up lab work and to see you surgeon at 2 months, 4 months, 6 months, 9 months and annually after your weight loss surgery. Additionally, the Department of Bariatric Surgery at our hospital is a member of the [employer] [address] Surgical Quality Improvement Program ([facility] NSQIP). As a participant in this program, we gather information on the outcomes of our patients after surgery. Please call the office for a follow up appointment at [phone]. If you have moved out of the area or have changed surgeons please call us and let us know the name of your doctor. Your health and feedback are important to us. We greatly appreciate your response. Thank you, [facility] [place] [facility]

## 2019-06-19 NOTE — PROGRESS NOTES
Per [facility] requirements;  E-mail and letter sent for follow up appointment. [employer] [facility] [place]  [employer] [facility]  [facility]      Dear Patient,    Your health is our main concern. It is important for your health to have follow-up lab work and to see you surgeon at 2 months, 4 months, 6 months, 9 months and annually after your weight loss surgery. Additionally, the Department of Bariatric Surgery at our hospital is a member of the [employer] [address] Surgical Quality Improvement Program ([facility] NSQIP). As a participant in this program, we gather information on the outcomes of our patients after surgery. Please call the office for a follow up appointment at [phone]. If you have moved out of the area or have changed surgeons please call us and let us know the name of your doctor. Your health and feedback are important to us. We greatly appreciate your response.        Thank you,  [facility] [address] [facility]

## 2020-04-28 DIAGNOSIS — N52.9 ERECTILE DYSFUNCTION, UNSPECIFIED ERECTILE DYSFUNCTION TYPE: ICD-10-CM

## 2020-04-29 RX ORDER — TADALAFIL 20 MG/1
TABLET ORAL
Qty: 18 TAB | Refills: 0 | Status: SHIPPED | OUTPATIENT
Start: 2020-04-29

## 2021-06-08 DIAGNOSIS — N52.9 ERECTILE DYSFUNCTION, UNSPECIFIED ERECTILE DYSFUNCTION TYPE: ICD-10-CM

## 2021-06-23 RX ORDER — TADALAFIL 20 MG/1
TABLET ORAL
Qty: 18 TABLET | Refills: 3 | OUTPATIENT
Start: 2021-06-23

## 2021-06-23 NOTE — TELEPHONE ENCOUNTER
Cialis refill not approved. Not seen by me/clinic since 2019. [employer] message to pt--to schedule follow-up appt.

## (undated) DEVICE — STERILE POLYISOPRENE POWDER-FREE SURGICAL GLOVES: Brand: PROTEXIS

## (undated) DEVICE — MAJ-1414 SINGLE USE ADPATER BIOPSY VALV: Brand: SINGLE USE ADAPTOR BIOPSY VALVE

## (undated) DEVICE — GOWN ISOLATN REG BLU POLY UNISX W/ THMB LOOP

## (undated) DEVICE — DEVON™ KNEE AND BODY STRAP 60" X 3" (1.5 M X 7.6 CM): Brand: DEVON

## (undated) DEVICE — (D)GLOVE SURG TRIFLX 7.5 PWD -- DISC BY MFR USE ITEM 102005